# Patient Record
Sex: FEMALE | Race: BLACK OR AFRICAN AMERICAN | Employment: UNEMPLOYED | URBAN - METROPOLITAN AREA
[De-identification: names, ages, dates, MRNs, and addresses within clinical notes are randomized per-mention and may not be internally consistent; named-entity substitution may affect disease eponyms.]

---

## 2017-01-04 ENCOUNTER — APPOINTMENT (OUTPATIENT)
Dept: LAB | Facility: HOSPITAL | Age: 27
End: 2017-01-04
Attending: OBSTETRICS & GYNECOLOGY
Payer: MEDICAID

## 2017-01-04 ENCOUNTER — ROUTINE PRENATAL (OUTPATIENT)
Dept: OBGYN CLINIC | Facility: CLINIC | Age: 27
End: 2017-01-04

## 2017-01-04 VITALS
BODY MASS INDEX: 31 KG/M2 | WEIGHT: 164 LBS | SYSTOLIC BLOOD PRESSURE: 105 MMHG | DIASTOLIC BLOOD PRESSURE: 67 MMHG | HEART RATE: 85 BPM

## 2017-01-04 DIAGNOSIS — Z34.82 ENCOUNTER FOR SUPERVISION OF OTHER NORMAL PREGNANCY IN SECOND TRIMESTER: Primary | ICD-10-CM

## 2017-01-04 DIAGNOSIS — Z34.92 ENCOUNTER FOR SUPERVISION OF NORMAL PREGNANCY IN SECOND TRIMESTER, UNSPECIFIED GRAVIDITY: ICD-10-CM

## 2017-01-04 LAB
APPEARANCE: CLEAR
ERYTHROCYTE [DISTWIDTH] IN BLOOD BY AUTOMATED COUNT: 14.1 % (ref 11–15)
GLUCOSE 1H P 50 G GLC PO SERPL-MCNC: 114 MG/DL
HCT VFR BLD AUTO: 28.7 % (ref 35–48)
HGB BLD-MCNC: 9.7 G/DL (ref 12–16)
MCH RBC QN AUTO: 29.1 PG (ref 27–32)
MCHC RBC AUTO-ENTMCNC: 33.6 G/DL (ref 32–37)
MCV RBC AUTO: 86.6 FL (ref 80–100)
MULTISTIX LOT#: NORMAL NUMERIC
PLATELET # BLD AUTO: 311 K/UL (ref 140–400)
PMV BLD AUTO: 7 FL (ref 7.4–10.3)
RBC # BLD AUTO: 3.32 M/UL (ref 3.7–5.4)
URINE-COLOR: YELLOW
WBC # BLD AUTO: 8.1 K/UL (ref 4–11)

## 2017-01-04 PROCEDURE — 36415 COLL VENOUS BLD VENIPUNCTURE: CPT

## 2017-01-04 PROCEDURE — 0502F SUBSEQUENT PRENATAL CARE: CPT | Performed by: OBSTETRICS & GYNECOLOGY

## 2017-01-04 PROCEDURE — 82950 GLUCOSE TEST: CPT

## 2017-01-04 PROCEDURE — 85027 COMPLETE CBC AUTOMATED: CPT

## 2017-01-15 ENCOUNTER — HOSPITAL ENCOUNTER (OUTPATIENT)
Facility: HOSPITAL | Age: 27
Discharge: HOME | End: 2017-01-15
Attending: OBSTETRICS & GYNECOLOGY | Admitting: OBSTETRICS & GYNECOLOGY
Payer: MEDICAID

## 2017-01-15 ENCOUNTER — TELEPHONE (OUTPATIENT)
Dept: OBGYN CLINIC | Facility: CLINIC | Age: 27
End: 2017-01-15

## 2017-01-15 VITALS
RESPIRATION RATE: 16 BRPM | DIASTOLIC BLOOD PRESSURE: 59 MMHG | SYSTOLIC BLOOD PRESSURE: 108 MMHG | HEART RATE: 94 BPM | TEMPERATURE: 98 F

## 2017-01-15 LAB
CLARITY UR: CLEAR
GLUCOSE UR STRIP-MCNC: NEGATIVE MG/DL
HGB UR QL STRIP: NEGATIVE
KETONES UR STRIP-MCNC: >=160 MG/DL
LEUKOCYTE ESTERASE UR QL STRIP: NEGATIVE
NITRITE UR QL STRIP: NEGATIVE
PH UR STRIP: 6.5 [PH]
PROT UR STRIP-MCNC: 30 MG/DL
SP GR UR STRIP: 1.02
UROBILINOGEN UR STRIP-ACNC: 2 MG/DL

## 2017-01-15 PROCEDURE — 59025 FETAL NON-STRESS TEST: CPT | Performed by: OBSTETRICS & GYNECOLOGY

## 2017-01-15 RX ORDER — ONDANSETRON 2 MG/ML
4 INJECTION INTRAMUSCULAR; INTRAVENOUS EVERY 6 HOURS PRN
Status: DISCONTINUED | OUTPATIENT
Start: 2017-01-15 | End: 2017-01-15

## 2017-01-15 RX ORDER — DEXTROSE, SODIUM CHLORIDE, SODIUM LACTATE, POTASSIUM CHLORIDE, AND CALCIUM CHLORIDE 5; .6; .31; .03; .02 G/100ML; G/100ML; G/100ML; G/100ML; G/100ML
INJECTION, SOLUTION INTRAVENOUS CONTINUOUS
Status: DISCONTINUED | OUTPATIENT
Start: 2017-01-15 | End: 2017-01-15

## 2017-01-15 NOTE — TRIAGE
[facility] HOSP - [facility]      Triage Note     Patient Status:  Observation    1990 MRN [id number]   Location [address] C-D Attending Jess Ramirez DO   Hosp Day # 0 PCP [address]: [id number] morning @ 0545. States the last time she vomitted was at 0545. Denies nausea at this time. Pt. Denies u/c, abdominal or low back pain. Urine dipstick completed. Orders received for monitoring and IV hydration. NST appropriate for gestational age.
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
## 2017-01-15 NOTE — TELEPHONE ENCOUNTER
ON Call: 28 wk pregnant c/o n/v/d x 1 day. \"Feeling very weak and passed out\" when getting up to go to bathroom. Didn't hit head. States she didn't lose consciousness however did fall to ground landing on her abdomen.   Pt to come to [facility] for evaluation

## 2017-01-18 ENCOUNTER — ROUTINE PRENATAL (OUTPATIENT)
Dept: OBGYN CLINIC | Facility: CLINIC | Age: 27
End: 2017-01-18

## 2017-01-18 VITALS
BODY MASS INDEX: 31 KG/M2 | HEART RATE: 92 BPM | DIASTOLIC BLOOD PRESSURE: 70 MMHG | SYSTOLIC BLOOD PRESSURE: 111 MMHG | WEIGHT: 166 LBS

## 2017-01-18 DIAGNOSIS — Z34.93 ENCOUNTER FOR SUPERVISION OF NORMAL PREGNANCY IN THIRD TRIMESTER, UNSPECIFIED GRAVIDITY: Primary | ICD-10-CM

## 2017-01-18 PROBLEM — O99.019 ANTEPARTUM ANEMIA: Status: ACTIVE | Noted: 2017-01-18

## 2017-01-18 PROBLEM — O21.9 NAUSEA/VOMITING IN PREGNANCY (HCC): Status: ACTIVE | Noted: 2017-01-18

## 2017-01-18 PROBLEM — O99.019 ANTEPARTUM ANEMIA (HCC): Status: ACTIVE | Noted: 2017-01-18

## 2017-01-18 PROBLEM — O21.9 NAUSEA/VOMITING IN PREGNANCY: Status: ACTIVE | Noted: 2017-01-18

## 2017-01-18 LAB
KETONES (URINE DIPSTICK): 5 MG/DL
MULTISTIX LOT#: NORMAL NUMERIC
PH, URINE: 6 (ref 4.5–8)
SPECIFIC GRAVITY: 1.01 (ref 1–1.03)

## 2017-01-18 PROCEDURE — 0502F SUBSEQUENT PRENATAL CARE: CPT | Performed by: OBSTETRICS & GYNECOLOGY

## 2017-01-18 RX ORDER — DOXYLAMINE SUCCINATE AND PYRIDOXINE HYDROCHLORIDE, DELAYED RELEASE TABLETS 10 MG/10 MG 10; 10 MG/1; MG/1
TABLET, DELAYED RELEASE ORAL
COMMUNITY
Start: 2016-10-10 | End: 2017-01-18

## 2017-01-18 RX ORDER — PYRIDOXINE HCL (VITAMIN B6) 25 MG
TABLET ORAL
COMMUNITY
Start: 2016-10-10 | End: 2017-01-18

## 2017-02-01 ENCOUNTER — HOSPITAL ENCOUNTER (OUTPATIENT)
Facility: HOSPITAL | Age: 27
Discharge: HOME | End: 2017-02-01
Attending: OBSTETRICS & GYNECOLOGY | Admitting: OBSTETRICS & GYNECOLOGY
Payer: MEDICAID

## 2017-02-01 ENCOUNTER — ROUTINE PRENATAL (OUTPATIENT)
Dept: OBGYN CLINIC | Facility: CLINIC | Age: 27
End: 2017-02-01

## 2017-02-01 VITALS
RESPIRATION RATE: 20 BRPM | HEART RATE: 92 BPM | SYSTOLIC BLOOD PRESSURE: 119 MMHG | TEMPERATURE: 99 F | DIASTOLIC BLOOD PRESSURE: 64 MMHG

## 2017-02-01 VITALS
WEIGHT: 165 LBS | HEART RATE: 93 BPM | BODY MASS INDEX: 31 KG/M2 | SYSTOLIC BLOOD PRESSURE: 120 MMHG | DIASTOLIC BLOOD PRESSURE: 73 MMHG

## 2017-02-01 DIAGNOSIS — Z34.83 ENCOUNTER FOR SUPERVISION OF OTHER NORMAL PREGNANCY IN THIRD TRIMESTER: Primary | ICD-10-CM

## 2017-02-01 DIAGNOSIS — O47.03 PRETERM UTERINE CONTRACTIONS, ANTEPARTUM, THIRD TRIMESTER: Primary | ICD-10-CM

## 2017-02-01 LAB
APPEARANCE: CLEAR
BASOPHILS # BLD: 0 K/UL (ref 0–0.2)
BASOPHILS NFR BLD: 0 %
BILIRUB UR QL: NEGATIVE
CLARITY UR: CLEAR
COLOR UR: COLORLESS
CRP SERPL-MCNC: 1 MG/DL (ref 0–0.9)
EOSINOPHIL # BLD: 0 K/UL (ref 0–0.7)
EOSINOPHIL NFR BLD: 0 %
ERYTHROCYTE [DISTWIDTH] IN BLOOD BY AUTOMATED COUNT: 15.7 % (ref 11–15)
GLUCOSE UR-MCNC: NEGATIVE MG/DL
HCT VFR BLD AUTO: 29.7 % (ref 35–48)
HGB BLD-MCNC: 10 G/DL (ref 12–16)
HGB UR QL STRIP.AUTO: NEGATIVE
KETONES UR-MCNC: 20 MG/DL
LEUKOCYTE ESTERASE UR QL STRIP.AUTO: NEGATIVE
LYMPHOCYTES # BLD: 1.5 K/UL (ref 1–4)
LYMPHOCYTES NFR BLD: 19 %
MCH RBC QN AUTO: 28.1 PG (ref 27–32)
MCHC RBC AUTO-ENTMCNC: 33.6 G/DL (ref 32–37)
MCV RBC AUTO: 83.7 FL (ref 80–100)
MONOCYTES # BLD: 0.4 K/UL (ref 0–1)
MONOCYTES NFR BLD: 5 %
MULTISTIX LOT#: ABNORMAL NUMERIC
NEUTROPHILS # BLD AUTO: 6.1 K/UL (ref 1.8–7.7)
NEUTROPHILS NFR BLD: 76 %
NITRITE UR QL STRIP.AUTO: NEGATIVE
PH UR: 7 [PH] (ref 5–8)
PH, URINE: 8 (ref 4.5–8)
PLATELET # BLD AUTO: 302 K/UL (ref 140–400)
PMV BLD AUTO: 7 FL (ref 7.4–10.3)
PROT UR-MCNC: NEGATIVE MG/DL
RBC # BLD AUTO: 3.55 M/UL (ref 3.7–5.4)
SP GR UR STRIP: 1 (ref 1–1.03)
SPECIFIC GRAVITY: 1 (ref 1–1.03)
URINE-COLOR: YELLOW
UROBILINOGEN UR STRIP-ACNC: <2
VIT C UR-MCNC: NEGATIVE MG/DL
WBC # BLD AUTO: 8 K/UL (ref 4–11)

## 2017-02-01 PROCEDURE — 59025 FETAL NON-STRESS TEST: CPT | Performed by: OBSTETRICS & GYNECOLOGY

## 2017-02-01 PROCEDURE — 0502F SUBSEQUENT PRENATAL CARE: CPT | Performed by: OBSTETRICS & GYNECOLOGY

## 2017-02-01 RX ORDER — TERBUTALINE SULFATE 1 MG/ML
0.25 INJECTION, SOLUTION SUBCUTANEOUS
Status: COMPLETED | OUTPATIENT
Start: 2017-02-01 | End: 2017-02-01

## 2017-02-01 RX ORDER — TERBUTALINE SULFATE 1 MG/ML
0.25 INJECTION, SOLUTION SUBCUTANEOUS
Status: DISCONTINUED | OUTPATIENT
Start: 2017-02-01 | End: 2017-02-01

## 2017-02-01 RX ORDER — BETAMETHASONE SODIUM PHOSPHATE AND BETAMETHASONE ACETATE 3; 3 MG/ML; MG/ML
12 INJECTION, SUSPENSION INTRA-ARTICULAR; INTRALESIONAL; INTRAMUSCULAR; SOFT TISSUE EVERY 24 HOURS
Status: DISCONTINUED | OUTPATIENT
Start: 2017-02-01 | End: 2017-02-01

## 2017-02-01 RX ORDER — SODIUM CHLORIDE, SODIUM LACTATE, POTASSIUM CHLORIDE, CALCIUM CHLORIDE 600; 310; 30; 20 MG/100ML; MG/100ML; MG/100ML; MG/100ML
INJECTION, SOLUTION INTRAVENOUS CONTINUOUS
Status: DISCONTINUED | OUTPATIENT
Start: 2017-02-01 | End: 2017-02-01

## 2017-02-01 NOTE — PROGRESS NOTES
Pt c/o inc pelvic pressure for the last couple days. Cx 1/60/-3. No presenting part. To [facility] for NST to r/o PTL. Unable to do FFN in office has patient had IC within the last 24 hours. RTC 2wks.

## 2017-02-01 NOTE — PROGRESS NOTES
Orders received to start IV,  but patient has poor access and 3 attempts made with no success. Drank full pitcher of water,  Contractions continue. Dr. Jennifer Mckeon at bedside . Asked for more IV attempts then he will recheck cervix.

## 2017-02-01 NOTE — CONSULTS
[address] [facility] Patient Status:  Outpatient in a Bed    1990 MRN [id number]   Location [facility] Attending Hernandez Stewart MD   Hosp Day # 0 PCP Flaco Bustillo     SUBJECTIVE:  Reason for Systems:  unremarkable    OBJECTIVE:  Temp:  [98.6 °F (37 °C)] 98.6 °F (37 °C)  Pulse:  [92-93] 92  Resp:  [20] 20  BP: (119-120)/(64-73) 119/64 mmHg  No intake or output data in the 24 hours ending 02/01/17 1631    Physical Exam:  General appearance: aler

## 2017-02-01 NOTE — TRIAGE
[facility] HOSP - [facility]      Triage Note    [place] Patient Status:  Outpatient in a Bed    1990 MRN [id number]   Location [address] C-D Attending Catalina Chanel MD   Hosp Day # 0 PCP Behzad Escobar: Salvador Victoria level II ultrasound. FHR reassuring with variables. To return tomorrow for 2nd betamethazone and NST. Home with instructions and verbalized understanding. No cervical change per Dr. Franki Lee. Contractions diminished and pt feeling less pressure.

## 2017-02-01 NOTE — PROGRESS NOTES
Ambulatory across waiting area to triage 5 for Level II ultrasound. Does not feel any contractions,  and states that pressure is less than previously felt. FHR reassuring. Labs available for viewing by Dr. Diana Fox.

## 2017-02-02 ENCOUNTER — APPOINTMENT (OUTPATIENT)
Dept: OBGYN CLINIC | Facility: HOSPITAL | Age: 27
End: 2017-02-02
Payer: MEDICAID

## 2017-02-02 ENCOUNTER — HOSPITAL ENCOUNTER (OUTPATIENT)
Facility: HOSPITAL | Age: 27
Discharge: HOME | End: 2017-02-02
Attending: OBSTETRICS & GYNECOLOGY | Admitting: OBSTETRICS & GYNECOLOGY
Payer: MEDICAID

## 2017-02-02 VITALS
SYSTOLIC BLOOD PRESSURE: 115 MMHG | HEART RATE: 108 BPM | TEMPERATURE: 99 F | DIASTOLIC BLOOD PRESSURE: 63 MMHG | RESPIRATION RATE: 18 BRPM

## 2017-02-02 PROBLEM — O47.00 THREATENED PRETERM LABOR: Status: ACTIVE | Noted: 2017-02-02

## 2017-02-02 PROBLEM — O47.00 THREATENED PRETERM LABOR (HCC): Status: ACTIVE | Noted: 2017-02-02

## 2017-02-02 PROCEDURE — 59025 FETAL NON-STRESS TEST: CPT | Performed by: OBSTETRICS & GYNECOLOGY

## 2017-02-02 RX ORDER — BETAMETHASONE SODIUM PHOSPHATE AND BETAMETHASONE ACETATE 3; 3 MG/ML; MG/ML
12 INJECTION, SUSPENSION INTRA-ARTICULAR; INTRALESIONAL; INTRAMUSCULAR; SOFT TISSUE ONCE
Status: COMPLETED | OUTPATIENT
Start: 2017-02-02 | End: 2017-02-02

## 2017-02-02 NOTE — NST
Nonstress Test   Patient: Lenis June    Gestation: 30w3d    NST: repeat NSTt ptl, and 2nd betamethasone injection today       Variability: Moderate           Accelerations: Yes           Decelerations: None            Baseline: 135 BPM           Uterin

## 2017-02-14 ENCOUNTER — ROUTINE PRENATAL (OUTPATIENT)
Dept: OBGYN CLINIC | Facility: CLINIC | Age: 27
End: 2017-02-14

## 2017-02-14 VITALS
SYSTOLIC BLOOD PRESSURE: 126 MMHG | HEART RATE: 94 BPM | DIASTOLIC BLOOD PRESSURE: 76 MMHG | WEIGHT: 166 LBS | BODY MASS INDEX: 31 KG/M2

## 2017-02-14 DIAGNOSIS — Z34.93 ENCOUNTER FOR SUPERVISION OF NORMAL PREGNANCY IN THIRD TRIMESTER, UNSPECIFIED GRAVIDITY: Primary | ICD-10-CM

## 2017-02-14 LAB
APPEARANCE: CLEAR
MULTISTIX EXPIRATION DATE: NORMAL DATE
MULTISTIX LOT#: NORMAL NUMERIC
PH, URINE: 7.5 (ref 4.5–8)
SPECIFIC GRAVITY: 1 (ref 1–1.03)
URINE-COLOR: YELLOW

## 2017-02-14 PROCEDURE — 0502F SUBSEQUENT PRENATAL CARE: CPT | Performed by: OBSTETRICS & GYNECOLOGY

## 2017-02-16 ENCOUNTER — TELEPHONE (OUTPATIENT)
Dept: OBGYN CLINIC | Facility: CLINIC | Age: 27
End: 2017-02-16

## 2017-02-16 PROBLEM — Z34.90 PREGNANCY, OBSTETRICAL CARE (HCC): Status: ACTIVE | Noted: 2017-02-16

## 2017-02-16 PROBLEM — Z34.90 PREGNANCY, OBSTETRICAL CARE: Status: ACTIVE | Noted: 2017-02-16

## 2017-02-16 NOTE — TELEPHONE ENCOUNTER
OB GYN SURGICAL SCHEDULING    Assessment: Term pregnancy and previous  section    Pre-Operative Procedure:  Repeat     Side: neither    In / on: 17 AM if possible    Date:  17    Admission:  AM Admit    Anesthesia: Spinal    Ad

## 2017-02-16 NOTE — TELEPHONE ENCOUNTER
Patient scheduled 4/3/17 at 1:30pm BRENDA STOVALL to assist.  Patient's instructions mailed. PAT orders entered. Tried to contact the patient one number is inactive and the other was noted as wrong number.

## 2017-02-21 ENCOUNTER — TELEPHONE (OUTPATIENT)
Dept: OBGYN CLINIC | Facility: CLINIC | Age: 27
End: 2017-02-21

## 2017-02-21 NOTE — TELEPHONE ENCOUNTER
Pt states that when she went to the bathroom and wiped there was mucus on tissue. Pt denies any collection of mucus in toilet. Pt denies any vaginal itching or irritation. Pt denies any bleeding, cramping or contractions.  Pt states baby is moving as much a

## 2017-02-24 ENCOUNTER — TELEPHONE (OUTPATIENT)
Dept: OBGYN CLINIC | Facility: CLINIC | Age: 27
End: 2017-02-24

## 2017-02-24 ENCOUNTER — HOSPITAL ENCOUNTER (OUTPATIENT)
Facility: HOSPITAL | Age: 27
Discharge: HOME | End: 2017-02-24
Attending: OBSTETRICS & GYNECOLOGY | Admitting: OBSTETRICS & GYNECOLOGY
Payer: MEDICAID

## 2017-02-24 VITALS — HEART RATE: 99 BPM | DIASTOLIC BLOOD PRESSURE: 61 MMHG | SYSTOLIC BLOOD PRESSURE: 111 MMHG

## 2017-02-24 PROBLEM — O42.90 AMNIOTIC FLUID LEAKING (HCC): Status: ACTIVE | Noted: 2017-02-24

## 2017-02-24 PROBLEM — O42.90 AMNIOTIC FLUID LEAKING: Status: ACTIVE | Noted: 2017-02-24

## 2017-02-24 LAB
AMNI: NEGATIVE
BACTERIA UR QL AUTO: NEGATIVE /HPF
BILIRUB UR QL: NEGATIVE
CLARITY UR: CLEAR
COLOR UR: YELLOW
GLUCOSE UR-MCNC: NEGATIVE MG/DL
HGB UR QL STRIP.AUTO: NEGATIVE
KETONES UR-MCNC: NEGATIVE MG/DL
NITRITE UR QL STRIP.AUTO: NEGATIVE
PH UR: 7 [PH] (ref 5–8)
PROT UR-MCNC: NEGATIVE MG/DL
RBC #/AREA URNS AUTO: 3 /HPF
SP GR UR STRIP: 1.02 (ref 1–1.03)
UROBILINOGEN UR STRIP-ACNC: 2
VIT C UR-MCNC: NEGATIVE MG/DL
WBC #/AREA URNS AUTO: 2 /HPF

## 2017-02-24 PROCEDURE — 59025 FETAL NON-STRESS TEST: CPT | Performed by: OBSTETRICS & GYNECOLOGY

## 2017-02-24 NOTE — TELEPHONE ENCOUNTER
Called pt back for f/u. Pt stated that over the last hour her pad has been dry and she hasnt noticed any LOF. Pt reports +FM.  Pt stated she is still feeling abdominal cramping/pressure in her vagina every 10 min lasting about 60 seconds now for the past ho

## 2017-02-24 NOTE — PROGRESS NOTES
Patient presented with complaints of leaking fluid this am. Also complaints of tightening today. Was seen on 17 for  contractions and had 2 doses of betamethasone 24 hours apart.

## 2017-02-24 NOTE — TRIAGE
[facility] HOSP - [facility]      Triage Note    Akie Daily Patient Status:  Outpatient in a Bed    1990 MRN [id number]   Location [address] C-D Attending Christa Dai MD   Hosp Day # 0 PCP Josephus Harada Madolyn Craven: Bello Calderón Reactive           Nonstress Test Second Interpretation: Reactive          FHR Category: Category I           Additional Comments   Dr. Ale Lee updated on maternal and fetal status. Amnisure negative, amnioindicator negative and no fernning. Cervix closed.  Ut

## 2017-02-24 NOTE — TELEPHONE ENCOUNTER
Pt is 33w4d calling to report that after she went to the BR this morning she \"felt something come out\" of her vagina and her underwear was \"really wet\". Pt stated that about half of her underwear felt wet and the fluid color looked clear.  Pt stated she

## 2017-03-02 ENCOUNTER — ROUTINE PRENATAL (OUTPATIENT)
Dept: OBGYN CLINIC | Facility: CLINIC | Age: 27
End: 2017-03-02

## 2017-03-02 ENCOUNTER — APPOINTMENT (OUTPATIENT)
Dept: LAB | Facility: HOSPITAL | Age: 27
End: 2017-03-02
Attending: OBSTETRICS & GYNECOLOGY
Payer: MEDICAID

## 2017-03-02 VITALS
DIASTOLIC BLOOD PRESSURE: 71 MMHG | WEIGHT: 167 LBS | HEART RATE: 95 BPM | SYSTOLIC BLOOD PRESSURE: 113 MMHG | BODY MASS INDEX: 32 KG/M2

## 2017-03-02 DIAGNOSIS — Z34.93 ENCOUNTER FOR SUPERVISION OF NORMAL PREGNANCY IN THIRD TRIMESTER, UNSPECIFIED GRAVIDITY: Primary | ICD-10-CM

## 2017-03-02 DIAGNOSIS — Z34.93 ENCOUNTER FOR SUPERVISION OF NORMAL PREGNANCY IN THIRD TRIMESTER, UNSPECIFIED GRAVIDITY: ICD-10-CM

## 2017-03-02 LAB
ERYTHROCYTE [DISTWIDTH] IN BLOOD BY AUTOMATED COUNT: 16.9 % (ref 11–15)
HCT VFR BLD AUTO: 25.4 % (ref 35–48)
HGB BLD-MCNC: 8.3 G/DL (ref 12–16)
MCH RBC QN AUTO: 26.9 PG (ref 27–32)
MCHC RBC AUTO-ENTMCNC: 32.6 G/DL (ref 32–37)
MCV RBC AUTO: 82.5 FL (ref 80–100)
MULTISTIX LOT#: NORMAL NUMERIC
PH, URINE: 7 (ref 4.5–8)
PLATELET # BLD AUTO: 265 K/UL (ref 140–400)
PMV BLD AUTO: 8.1 FL (ref 7.4–10.3)
RBC # BLD AUTO: 3.08 M/UL (ref 3.7–5.4)
SPECIFIC GRAVITY: 1.01 (ref 1–1.03)
UROBILINOGEN,SEMI-QN: 0.2 MG/DL (ref 0–1.9)
WBC # BLD AUTO: 6 K/UL (ref 4–11)

## 2017-03-02 PROCEDURE — 85027 COMPLETE CBC AUTOMATED: CPT

## 2017-03-02 PROCEDURE — 0502F SUBSEQUENT PRENATAL CARE: CPT | Performed by: OBSTETRICS & GYNECOLOGY

## 2017-03-02 PROCEDURE — 36415 COLL VENOUS BLD VENIPUNCTURE: CPT

## 2017-03-02 PROCEDURE — 86780 TREPONEMA PALLIDUM: CPT

## 2017-03-02 NOTE — PROGRESS NOTES
Pt states she has irregular contractions. No LOF or Bleeding. Feeling good fetal movement.    RTC 2 wks

## 2017-03-03 LAB — T PALLIDUM AB SER QL: NEGATIVE

## 2017-03-07 ENCOUNTER — TELEPHONE (OUTPATIENT)
Dept: OBGYN CLINIC | Facility: CLINIC | Age: 27
End: 2017-03-07

## 2017-03-07 NOTE — TELEPHONE ENCOUNTER
Paged while on call at [phone] and returned call immediately. Pt with cramping this morning and pelvic pressure. No bleeding and good fetal movement.  Pt told to hydrate and rest. If pt pam she is to call back when they are every 15 minutes and regular

## 2017-03-12 ENCOUNTER — HOSPITAL ENCOUNTER (OUTPATIENT)
Facility: HOSPITAL | Age: 27
Setting detail: OBSERVATION
Discharge: HOME | End: 2017-03-12
Attending: OBSTETRICS & GYNECOLOGY | Admitting: OBSTETRICS & GYNECOLOGY
Payer: MEDICAID

## 2017-03-12 ENCOUNTER — TELEPHONE (OUTPATIENT)
Dept: OBGYN CLINIC | Facility: CLINIC | Age: 27
End: 2017-03-12

## 2017-03-12 VITALS
TEMPERATURE: 99 F | RESPIRATION RATE: 16 BRPM | SYSTOLIC BLOOD PRESSURE: 121 MMHG | HEART RATE: 93 BPM | DIASTOLIC BLOOD PRESSURE: 67 MMHG

## 2017-03-12 PROBLEM — Z34.90 PREGNANCY (HCC): Status: ACTIVE | Noted: 2017-03-12

## 2017-03-12 PROBLEM — Z34.90 PREGNANCY: Status: ACTIVE | Noted: 2017-03-12

## 2017-03-12 PROCEDURE — 59025 FETAL NON-STRESS TEST: CPT | Performed by: OBSTETRICS & GYNECOLOGY

## 2017-03-12 NOTE — TELEPHONE ENCOUNTER
Spoke with patient c/o UC's q 15-20 min and increased DC since early AM. She has been hydrating with no help. She is planned term RLTCS.  I asked her to come to [facility] for labor eval.

## 2017-03-12 NOTE — TRIAGE
[facility]      Triage Note    Serena Fallon Patient Status:  Observation    1990 MRN [id number]   Location [address] C-D Attending Hilario Don, [facility] Day # 0 PCP 50 [address]: [id number] Pt denies nausea/vomiting or diarrhea. No edema. Denies LOF.        Dianne Griffin RN  3/12/2017 3:47 PM

## 2017-03-13 ENCOUNTER — ROUTINE PRENATAL (OUTPATIENT)
Dept: OBGYN CLINIC | Facility: CLINIC | Age: 27
End: 2017-03-13

## 2017-03-13 VITALS
HEART RATE: 84 BPM | WEIGHT: 171.63 LBS | SYSTOLIC BLOOD PRESSURE: 118 MMHG | BODY MASS INDEX: 32 KG/M2 | DIASTOLIC BLOOD PRESSURE: 76 MMHG

## 2017-03-13 DIAGNOSIS — Z34.93 ENCOUNTER FOR SUPERVISION OF NORMAL PREGNANCY IN THIRD TRIMESTER, UNSPECIFIED GRAVIDITY: Primary | ICD-10-CM

## 2017-03-13 LAB
APPEARANCE: CLEAR
MULTISTIX LOT#: NORMAL NUMERIC
PH, URINE: 7.5 (ref 4.5–8)
SPECIFIC GRAVITY: 1.01 (ref 1–1.03)
URINE-COLOR: YELLOW

## 2017-03-13 PROCEDURE — 0502F SUBSEQUENT PRENATAL CARE: CPT | Performed by: OBSTETRICS & GYNECOLOGY

## 2017-03-13 NOTE — PROGRESS NOTES
NO issues. Contractions have stopped since yesterdays visit to [facility]. NO LOF or VB.  +FM. GBS collected. Encouraged IRON BID for anemia. RTC 1 wk.

## 2017-03-13 NOTE — PROGRESS NOTES
NST note:    Indication: threatened  labor    FHT baseline: 130    Variability: moderate    Accels:  present    Decels: No    Tocos:  Yes    Category: 1 tracing    Plan: Seen and examined by me. Cervix 1 cm external / closed internal / 0% / -3.  PTL

## 2017-03-20 ENCOUNTER — HOSPITAL ENCOUNTER (INPATIENT)
Facility: HOSPITAL | Age: 27
LOS: 3 days | Discharge: HOME | End: 2017-03-23
Attending: OBSTETRICS & GYNECOLOGY | Admitting: OBSTETRICS & GYNECOLOGY
Payer: MEDICAID

## 2017-03-20 ENCOUNTER — ANESTHESIA EVENT (OUTPATIENT)
Dept: OBGYN UNIT | Facility: HOSPITAL | Age: 27
End: 2017-03-20
Payer: MEDICAID

## 2017-03-20 ENCOUNTER — TELEPHONE (OUTPATIENT)
Dept: OBGYN CLINIC | Facility: CLINIC | Age: 27
End: 2017-03-20

## 2017-03-20 ENCOUNTER — ROUTINE PRENATAL (OUTPATIENT)
Dept: OBGYN CLINIC | Facility: CLINIC | Age: 27
End: 2017-03-20

## 2017-03-20 ENCOUNTER — SURGERY (OUTPATIENT)
Age: 27
End: 2017-03-20

## 2017-03-20 ENCOUNTER — ANESTHESIA (OUTPATIENT)
Dept: OBGYN UNIT | Facility: HOSPITAL | Age: 27
End: 2017-03-20
Payer: MEDICAID

## 2017-03-20 VITALS
HEART RATE: 94 BPM | WEIGHT: 176.19 LBS | BODY MASS INDEX: 33 KG/M2 | DIASTOLIC BLOOD PRESSURE: 83 MMHG | SYSTOLIC BLOOD PRESSURE: 125 MMHG

## 2017-03-20 DIAGNOSIS — Z34.93 ENCOUNTER FOR SUPERVISION OF NORMAL PREGNANCY IN THIRD TRIMESTER, UNSPECIFIED GRAVIDITY: Primary | ICD-10-CM

## 2017-03-20 PROBLEM — Z98.891 H/O CESAREAN SECTION: Status: ACTIVE | Noted: 2017-03-20

## 2017-03-20 LAB
ANTIBODY SCREEN: NEGATIVE
BASOPHILS # BLD: 0 K/UL (ref 0–0.2)
BASOPHILS NFR BLD: 1 %
EOSINOPHIL # BLD: 0 K/UL (ref 0–0.7)
EOSINOPHIL NFR BLD: 0 %
ERYTHROCYTE [DISTWIDTH] IN BLOOD BY AUTOMATED COUNT: 18.3 % (ref 11–15)
HCT VFR BLD AUTO: 27.4 % (ref 35–48)
HGB BLD-MCNC: 9.1 G/DL (ref 12–16)
LYMPHOCYTES # BLD: 2 K/UL (ref 1–4)
LYMPHOCYTES NFR BLD: 23 %
MCH RBC QN AUTO: 26.5 PG (ref 27–32)
MCHC RBC AUTO-ENTMCNC: 33.1 G/DL (ref 32–37)
MCV RBC AUTO: 80.1 FL (ref 80–100)
MONOCYTES # BLD: 0.5 K/UL (ref 0–1)
MONOCYTES NFR BLD: 6 %
MULTISTIX LOT#: NORMAL NUMERIC
NEUTROPHILS # BLD AUTO: 6.1 K/UL (ref 1.8–7.7)
NEUTROPHILS NFR BLD: 71 %
PH, URINE: 7 (ref 4.5–8)
PLATELET # BLD AUTO: 280 K/UL (ref 140–400)
PMV BLD AUTO: 7.9 FL (ref 7.4–10.3)
RBC # BLD AUTO: 3.42 M/UL (ref 3.7–5.4)
RH BLOOD TYPE: POSITIVE
SPECIFIC GRAVITY: 1.01 (ref 1–1.03)
UROBILINOGEN,SEMI-QN: 0.2 MG/DL (ref 0–1.9)
WBC # BLD AUTO: 8.7 K/UL (ref 4–11)

## 2017-03-20 PROCEDURE — 59514 CESAREAN DELIVERY ONLY: CPT | Performed by: OBSTETRICS & GYNECOLOGY

## 2017-03-20 PROCEDURE — 0502F SUBSEQUENT PRENATAL CARE: CPT | Performed by: OBSTETRICS & GYNECOLOGY

## 2017-03-20 RX ORDER — MELATONIN
325
COMMUNITY
End: 2017-04-02

## 2017-03-20 RX ORDER — ONDANSETRON 2 MG/ML
4 INJECTION INTRAMUSCULAR; INTRAVENOUS EVERY 6 HOURS PRN
Status: DISCONTINUED | OUTPATIENT
Start: 2017-03-20 | End: 2017-03-21 | Stop reason: HOSPADM

## 2017-03-20 RX ORDER — HALOPERIDOL 5 MG/ML
0.5 INJECTION INTRAMUSCULAR ONCE AS NEEDED
Status: ACTIVE | OUTPATIENT
Start: 2017-03-20 | End: 2017-03-20

## 2017-03-20 RX ORDER — HYDROCODONE BITARTRATE AND ACETAMINOPHEN 7.5; 325 MG/1; MG/1
1 TABLET ORAL EVERY 6 HOURS PRN
Status: DISPENSED | OUTPATIENT
Start: 2017-03-20 | End: 2017-03-21

## 2017-03-20 RX ORDER — DIPHENHYDRAMINE HYDROCHLORIDE 50 MG/ML
12.5 INJECTION INTRAMUSCULAR; INTRAVENOUS EVERY 4 HOURS PRN
Status: ACTIVE | OUTPATIENT
Start: 2017-03-20 | End: 2017-03-21

## 2017-03-20 RX ORDER — ACETAMINOPHEN 325 MG/1
650 TABLET ORAL EVERY 6 HOURS PRN
Status: ACTIVE | OUTPATIENT
Start: 2017-03-20 | End: 2017-03-21

## 2017-03-20 RX ORDER — METOCLOPRAMIDE HYDROCHLORIDE 5 MG/ML
10 INJECTION INTRAMUSCULAR; INTRAVENOUS ONCE
Status: COMPLETED | OUTPATIENT
Start: 2017-03-20 | End: 2017-03-20

## 2017-03-20 RX ORDER — DIPHENHYDRAMINE HCL 25 MG
25 CAPSULE ORAL EVERY 4 HOURS PRN
Status: ACTIVE | OUTPATIENT
Start: 2017-03-20 | End: 2017-03-21

## 2017-03-20 RX ORDER — HYDROCODONE BITARTRATE AND ACETAMINOPHEN 7.5; 325 MG/1; MG/1
2 TABLET ORAL EVERY 6 HOURS PRN
Status: ACTIVE | OUTPATIENT
Start: 2017-03-20 | End: 2017-03-21

## 2017-03-20 RX ORDER — HYDROMORPHONE HYDROCHLORIDE 1 MG/ML
0.6 INJECTION, SOLUTION INTRAMUSCULAR; INTRAVENOUS; SUBCUTANEOUS
Status: ACTIVE | OUTPATIENT
Start: 2017-03-20 | End: 2017-03-21

## 2017-03-20 RX ORDER — SODIUM CHLORIDE, SODIUM LACTATE, POTASSIUM CHLORIDE, CALCIUM CHLORIDE 600; 310; 30; 20 MG/100ML; MG/100ML; MG/100ML; MG/100ML
INJECTION, SOLUTION INTRAVENOUS
Status: COMPLETED
Start: 2017-03-20 | End: 2017-03-20

## 2017-03-20 RX ORDER — SODIUM CHLORIDE 9 MG/ML
INJECTION, SOLUTION INTRAVENOUS ONCE
Status: DISCONTINUED | OUTPATIENT
Start: 2017-03-20 | End: 2017-03-21

## 2017-03-20 RX ORDER — NALOXONE HYDROCHLORIDE 0.4 MG/ML
0.08 INJECTION, SOLUTION INTRAMUSCULAR; INTRAVENOUS; SUBCUTANEOUS
Status: ACTIVE | OUTPATIENT
Start: 2017-03-20 | End: 2017-03-21

## 2017-03-20 RX ORDER — SODIUM CHLORIDE 0.9 % (FLUSH) 0.9 %
10 SYRINGE (ML) INJECTION AS NEEDED
Status: DISCONTINUED | OUTPATIENT
Start: 2017-03-20 | End: 2017-03-21 | Stop reason: HOSPADM

## 2017-03-20 RX ORDER — HYDROMORPHONE HYDROCHLORIDE 1 MG/ML
0.4 INJECTION, SOLUTION INTRAMUSCULAR; INTRAVENOUS; SUBCUTANEOUS
Status: ACTIVE | OUTPATIENT
Start: 2017-03-20 | End: 2017-03-21

## 2017-03-20 RX ORDER — NALBUPHINE HCL 10 MG/ML
2.5 AMPUL (ML) INJECTION EVERY 4 HOURS PRN
Status: ACTIVE | OUTPATIENT
Start: 2017-03-20 | End: 2017-03-21

## 2017-03-20 RX ORDER — ONDANSETRON 2 MG/ML
4 INJECTION INTRAMUSCULAR; INTRAVENOUS ONCE AS NEEDED
Status: ACTIVE | OUTPATIENT
Start: 2017-03-20 | End: 2017-03-20

## 2017-03-20 RX ORDER — AMMONIA INHALANTS 0.04 G/.3ML
0.3 INHALANT RESPIRATORY (INHALATION) ONCE
Status: DISCONTINUED | OUTPATIENT
Start: 2017-03-20 | End: 2017-03-21 | Stop reason: HOSPADM

## 2017-03-20 RX ORDER — FAMOTIDINE 10 MG/ML
20 INJECTION, SOLUTION INTRAVENOUS ONCE
Status: DISCONTINUED | OUTPATIENT
Start: 2017-03-20 | End: 2017-03-21

## 2017-03-20 RX ORDER — SODIUM CHLORIDE, SODIUM LACTATE, POTASSIUM CHLORIDE, CALCIUM CHLORIDE 600; 310; 30; 20 MG/100ML; MG/100ML; MG/100ML; MG/100ML
INJECTION, SOLUTION INTRAVENOUS CONTINUOUS
Status: DISCONTINUED | OUTPATIENT
Start: 2017-03-20 | End: 2017-03-21 | Stop reason: HOSPADM

## 2017-03-20 RX ADMIN — SODIUM CHLORIDE, SODIUM LACTATE, POTASSIUM CHLORIDE, CALCIUM CHLORIDE: 600; 310; 30; 20 INJECTION, SOLUTION INTRAVENOUS at 23:20:00

## 2017-03-20 RX ADMIN — ONDANSETRON 4 MG: 2 INJECTION INTRAMUSCULAR; INTRAVENOUS at 23:55:00

## 2017-03-20 RX ADMIN — MIDAZOLAM HYDROCHLORIDE 2 MG: 1 INJECTION INTRAMUSCULAR; INTRAVENOUS at 23:58:00

## 2017-03-20 NOTE — PROGRESS NOTES
Pt uncomfortable and not sleeping well. Denies cramping or contractions. Reviewed labor precautions. Reviewed kick counts.    RTC 1 wk

## 2017-03-21 ENCOUNTER — TELEPHONE (OUTPATIENT)
Dept: OBGYN CLINIC | Facility: CLINIC | Age: 27
End: 2017-03-21

## 2017-03-21 PROBLEM — Z98.891 STATUS POST REPEAT LOW TRANSVERSE CESAREAN SECTION: Status: ACTIVE | Noted: 2017-03-21

## 2017-03-21 PROBLEM — Z98.891 STATUS POST CESAREAN SECTION: Status: ACTIVE | Noted: 2017-03-21

## 2017-03-21 LAB
BASOPHILS # BLD: 0 K/UL (ref 0–0.2)
BASOPHILS NFR BLD: 0 %
EOSINOPHIL # BLD: 0 K/UL (ref 0–0.7)
EOSINOPHIL NFR BLD: 0 %
ERYTHROCYTE [DISTWIDTH] IN BLOOD BY AUTOMATED COUNT: 18.5 % (ref 11–15)
HCT VFR BLD AUTO: 25.4 % (ref 35–48)
HGB BLD-MCNC: 8.3 G/DL (ref 12–16)
LYMPHOCYTES # BLD: 1.6 K/UL (ref 1–4)
LYMPHOCYTES NFR BLD: 13 %
MCH RBC QN AUTO: 26.4 PG (ref 27–32)
MCHC RBC AUTO-ENTMCNC: 32.6 G/DL (ref 32–37)
MCV RBC AUTO: 80.8 FL (ref 80–100)
MONOCYTES # BLD: 0.7 K/UL (ref 0–1)
MONOCYTES NFR BLD: 6 %
NEUTROPHILS # BLD AUTO: 10.1 K/UL (ref 1.8–7.7)
NEUTROPHILS NFR BLD: 81 %
PLATELET # BLD AUTO: 237 K/UL (ref 140–400)
PMV BLD AUTO: 7.9 FL (ref 7.4–10.3)
RBC # BLD AUTO: 3.14 M/UL (ref 3.7–5.4)
WBC # BLD AUTO: 12.4 K/UL (ref 4–11)

## 2017-03-21 PROCEDURE — 59514 CESAREAN DELIVERY ONLY: CPT | Performed by: OBSTETRICS & GYNECOLOGY

## 2017-03-21 RX ORDER — 0.9 % SODIUM CHLORIDE 0.9 %
VIAL (ML) INJECTION
Status: DISPENSED
Start: 2017-03-21 | End: 2017-03-22

## 2017-03-21 RX ORDER — ZOLPIDEM TARTRATE 5 MG/1
5 TABLET ORAL NIGHTLY PRN
Status: DISCONTINUED | OUTPATIENT
Start: 2017-03-21 | End: 2017-03-23

## 2017-03-21 RX ORDER — IBUPROFEN 600 MG/1
600 TABLET ORAL EVERY 6 HOURS
Status: DISCONTINUED | OUTPATIENT
Start: 2017-03-21 | End: 2017-03-23

## 2017-03-21 RX ORDER — AMMONIA INHALANTS 0.04 G/.3ML
0.3 INHALANT RESPIRATORY (INHALATION) AS NEEDED
Status: DISCONTINUED | OUTPATIENT
Start: 2017-03-21 | End: 2017-03-23

## 2017-03-21 RX ORDER — ONDANSETRON 2 MG/ML
INJECTION INTRAMUSCULAR; INTRAVENOUS AS NEEDED
Status: DISCONTINUED | OUTPATIENT
Start: 2017-03-20 | End: 2017-03-21 | Stop reason: SURG

## 2017-03-21 RX ORDER — MIDAZOLAM HYDROCHLORIDE 1 MG/ML
INJECTION INTRAMUSCULAR; INTRAVENOUS AS NEEDED
Status: DISCONTINUED | OUTPATIENT
Start: 2017-03-20 | End: 2017-03-21 | Stop reason: SURG

## 2017-03-21 RX ORDER — SODIUM PHOSPHATE, DIBASIC AND SODIUM PHOSPHATE, MONOBASIC 7; 19 G/133ML; G/133ML
1 ENEMA RECTAL ONCE AS NEEDED
Status: ACTIVE | OUTPATIENT
Start: 2017-03-21 | End: 2017-03-21

## 2017-03-21 RX ORDER — PRENATAL VIT,CAL 76/IRON/FOLIC 29 MG-1 MG
1 TABLET ORAL DAILY
Status: DISCONTINUED | OUTPATIENT
Start: 2017-03-21 | End: 2017-03-23

## 2017-03-21 RX ORDER — BISACODYL 10 MG
10 SUPPOSITORY, RECTAL RECTAL
Status: DISCONTINUED | OUTPATIENT
Start: 2017-03-21 | End: 2017-03-23

## 2017-03-21 RX ORDER — KETOROLAC TROMETHAMINE 30 MG/ML
30 INJECTION, SOLUTION INTRAMUSCULAR; INTRAVENOUS EVERY 6 HOURS
Status: DISPENSED | OUTPATIENT
Start: 2017-03-21 | End: 2017-03-23

## 2017-03-21 RX ORDER — SIMETHICONE 80 MG
80 TABLET,CHEWABLE ORAL 3 TIMES DAILY PRN
Status: DISCONTINUED | OUTPATIENT
Start: 2017-03-21 | End: 2017-03-23

## 2017-03-21 RX ORDER — POLYETHYLENE GLYCOL 3350 17 G/17G
17 POWDER, FOR SOLUTION ORAL DAILY PRN
Status: DISCONTINUED | OUTPATIENT
Start: 2017-03-21 | End: 2017-03-23

## 2017-03-21 RX ORDER — SODIUM CHLORIDE 0.9 % (FLUSH) 0.9 %
10 SYRINGE (ML) INJECTION AS NEEDED
Status: DISCONTINUED | OUTPATIENT
Start: 2017-03-21 | End: 2017-03-23

## 2017-03-21 RX ORDER — HYDROCODONE BITARTRATE AND ACETAMINOPHEN 5; 325 MG/1; MG/1
1-2 TABLET ORAL EVERY 6 HOURS PRN
Status: DISCONTINUED | OUTPATIENT
Start: 2017-03-21 | End: 2017-03-23

## 2017-03-21 RX ORDER — SODIUM CHLORIDE, SODIUM LACTATE, POTASSIUM CHLORIDE, CALCIUM CHLORIDE 600; 310; 30; 20 MG/100ML; MG/100ML; MG/100ML; MG/100ML
INJECTION, SOLUTION INTRAVENOUS CONTINUOUS PRN
Status: DISCONTINUED | OUTPATIENT
Start: 2017-03-20 | End: 2017-03-21 | Stop reason: SURG

## 2017-03-21 RX ORDER — ONDANSETRON 2 MG/ML
4 INJECTION INTRAMUSCULAR; INTRAVENOUS EVERY 6 HOURS PRN
Status: DISCONTINUED | OUTPATIENT
Start: 2017-03-21 | End: 2017-03-23

## 2017-03-21 RX ORDER — DEXTROSE, SODIUM CHLORIDE, SODIUM LACTATE, POTASSIUM CHLORIDE, AND CALCIUM CHLORIDE 5; .6; .31; .03; .02 G/100ML; G/100ML; G/100ML; G/100ML; G/100ML
INJECTION, SOLUTION INTRAVENOUS CONTINUOUS
Status: DISCONTINUED | OUTPATIENT
Start: 2017-03-21 | End: 2017-03-23

## 2017-03-21 RX ORDER — DOCUSATE SODIUM 100 MG/1
100 CAPSULE, LIQUID FILLED ORAL
Status: DISCONTINUED | OUTPATIENT
Start: 2017-03-21 | End: 2017-03-23

## 2017-03-21 RX ADMIN — SODIUM CHLORIDE, SODIUM LACTATE, POTASSIUM CHLORIDE, CALCIUM CHLORIDE: 600; 310; 30; 20 INJECTION, SOLUTION INTRAVENOUS at 00:41:00

## 2017-03-21 RX ADMIN — SODIUM CHLORIDE, SODIUM LACTATE, POTASSIUM CHLORIDE, CALCIUM CHLORIDE: 600; 310; 30; 20 INJECTION, SOLUTION INTRAVENOUS at 00:05:00

## 2017-03-21 NOTE — ANESTHESIA POSTPROCEDURE EVALUATION
Patient: Mabelene Daily    Procedure Summary     Date Anesthesia Start Anesthesia Stop Room / Location    17 [phone] (17) [facility] L+D OR H L+D OR       Procedure Diagnosis Surgeon Responsible Provider     (N/A ) (R C/S [facility] 4/10/17

## 2017-03-21 NOTE — DISCHARGE SUMMARY
[facility]    Discharge Summary    Guy Resendiz Patient Status:  Inpatient    1990 MRN [id number]   Location [address] C-D Attending Flo Phelan MD   [facility] Day # 1       Delivering OB Clinician: Dr. Karen Gavin

## 2017-03-21 NOTE — LACTATION NOTE
LACTATION NOTE - MOTHER           Problems identified  Problems identified: Knowledge deficit;Milk supply WNL  Problems Identified Other: Initiated breastfeeding but formula supplemented with formula after birth.     Maternal history  Maternal history: Cesa

## 2017-03-21 NOTE — OPERATIVE REPORT
Operative Report for  Section:    Date: 2017  Patient: Cristhian Zimmerman  : 1990  MRN: [id number]    Preoperative Diagnosis: Intrauterine Pregnancy 37w1d, Spontaneous rupture of membranes with history of  section   Postoperative off.  Attention was then turned to the inferior aspect of this incision which, in a similar fashion, was grasped, tented up with the Kocher clamps, and the rectus muscles dissected off.  Small portion of omentum was adhesed to fascia and removed with the marcia recovery room in stable condition.     Eli Johnson, 03/21/2017, 12:45 AM

## 2017-03-21 NOTE — H&P
31 Linda Lowe Patient Status:  Inpatient    1990 MRN [id number]   Location [address] C-D Attending Hardeep Mohan MD   Hosp Day # 0 MARIO Weaver     Date of Admission:  3/ Prescriptions prior to admission:  ferrous sulfate 325 (65 FE) MG Oral Tab EC Take 325 mg by mouth daily with breakfast. Disp:  Rfl:  3/19/2017 at Unknown time   Prenatal MV-Min-Fe Fum-FA-DHA (PRENATAL+DHA) 28-0.975 & 200 MG Oral Misc Take 1 tablet by mo Amelia Reynolds  3/20/2017  9:32 PM

## 2017-03-21 NOTE — PROGRESS NOTES
Patient received into room [360] via bed .    Bedside report received from [facility] - PAOLO coats RN.  Bed in locked and low position.  Side rails up x2.  Vitals signs within normal limits, fundus firm at U/U, lochia small, no clots noted.  IV site unremarkable.  Pa

## 2017-03-21 NOTE — PLAN OF CARE
ANXIETY    • Will report anxiety at manageable levels Progressing        BREAST FEEDING    • Optimize infant feeding at the breast Progressing        INADEQUATE LATCH, SUCK OR SWALLOW    • Demonstrate ability to latch and sustain latch, audible swallowing

## 2017-03-21 NOTE — PROGRESS NOTES
Post-Partum Note   3/21/2017, 8:22 AM    Subjective:  Patient doing well. Pain controlled currently . Lochia is small. She reports she has not tried a regular diet. She has not yet ambulated and denies lightheadedness. Hinton is present.  Overall patient s

## 2017-03-21 NOTE — ANESTHESIA PREPROCEDURE EVALUATION
Anesthesia PreOp Note    HPI:     Wesley Jackson is a 32year old female who presents for preoperative consultation requested by: Charla Jaeger DO    Date of Surgery: 3/20/2017 - 3/21/2017    Procedure(s):    Indication: R C/S [facility] 4/10/17    * injection 4 mg 4 mg Intravenous Q6H PRN Marek Jorgensen MD    CeFAZolin Sodium (ANCEF/KEFZOL) IVPB premix 2 g 2 g Intravenous 30 Min Pre-Op Marek Jorgensen MD    0.9%  NaCl infusion  Intravenous Once Marek Jorgensen MD    famoTIDine (PEPCID) injectio Other findings: Anemic        Anesthesia Plan:   ASA:  2  Emergent    Plan:   Spinal and general  Airway:  ETT  Post-op Pain Management: Intrathecal narcotics  Plan Comments: SAB with Duramorph, GETA if fails.   T&C for two units  Informed Consent Plan

## 2017-03-21 NOTE — TELEPHONE ENCOUNTER
Pt called with big gush of fluid and continued leaking. She was told to come to the hospital for further evaluation.

## 2017-03-21 NOTE — LACTATION NOTE
This note was copied from the chart of Can  Adiel Divine.   LACTATION NOTE - INFANT    Evaluation Type  Evaluation Type: Inpatient    Problems & Assessment  Problems Diagnosed or Identified: 37-38 weeks gestation;Sleepy  Infant Assessment: Minimal hunger cues pre

## 2017-03-21 NOTE — ANESTHESIA PROCEDURE NOTES
Spinal Block  Performed by: Irasema Galvan by: Analia Moore    Patient Location:  OB  Start Time:  3/20/2017 11:23 PM  End Time:  3/21/2017 11:37 PM  Site identification: surface landmarks    Reason for Block: surgical anesthesia

## 2017-03-22 NOTE — PROGRESS NOTES
[facility]    OB/GYNE Progress Note      Vlad Lopez Patient Status:  Inpatient    1990 MRN [id number]   Location [facility] 3SE Attending Anel Sanchez MD   Hosp Day # 2 PCP Esme Higuera

## 2017-03-23 VITALS
RESPIRATION RATE: 16 BRPM | OXYGEN SATURATION: 99 % | HEIGHT: 61 IN | TEMPERATURE: 99 F | HEART RATE: 85 BPM | SYSTOLIC BLOOD PRESSURE: 124 MMHG | WEIGHT: 176.19 LBS | BODY MASS INDEX: 33.27 KG/M2 | DIASTOLIC BLOOD PRESSURE: 78 MMHG

## 2017-03-23 RX ORDER — IBUPROFEN 600 MG/1
600 TABLET ORAL EVERY 6 HOURS
Qty: 30 TABLET | Refills: 0 | Status: SHIPPED | OUTPATIENT
Start: 2017-03-23 | End: 2017-04-02

## 2017-03-23 RX ORDER — HYDROCODONE BITARTRATE AND ACETAMINOPHEN 5; 325 MG/1; MG/1
1 TABLET ORAL EVERY 4 HOURS PRN
Qty: 30 TABLET | Refills: 0 | Status: SHIPPED | OUTPATIENT
Start: 2017-03-23 | End: 2017-04-02

## 2017-03-23 NOTE — PROGRESS NOTES
Discharge order received from MD. All discharge paperwork reviewed. Patient verbalizes understanding. Instructed to make follow up appointment for 6 weeks with Dr. Hilario Don. Patient discharged via wheelchair with infant in [facility].

## 2017-03-24 LAB — BLOOD TYPE BARCODE: 6200

## 2017-03-25 ENCOUNTER — HOSPITAL ENCOUNTER (INPATIENT)
Facility: HOSPITAL | Age: 27
LOS: 2 days | Discharge: HOME | DRG: 776 | End: 2017-03-27
Attending: EMERGENCY MEDICINE | Admitting: OBSTETRICS & GYNECOLOGY
Payer: MEDICAID

## 2017-03-25 ENCOUNTER — TELEPHONE (OUTPATIENT)
Dept: OBGYN CLINIC | Facility: CLINIC | Age: 27
End: 2017-03-25

## 2017-03-25 ENCOUNTER — NURSE ONLY (OUTPATIENT)
Dept: OBGYN CLINIC | Facility: CLINIC | Age: 27
End: 2017-03-25

## 2017-03-25 ENCOUNTER — APPOINTMENT (OUTPATIENT)
Dept: GENERAL RADIOLOGY | Facility: HOSPITAL | Age: 27
DRG: 776 | End: 2017-03-25
Attending: EMERGENCY MEDICINE
Payer: MEDICAID

## 2017-03-25 ENCOUNTER — APPOINTMENT (OUTPATIENT)
Dept: CT IMAGING | Facility: HOSPITAL | Age: 27
DRG: 776 | End: 2017-03-25
Attending: EMERGENCY MEDICINE
Payer: MEDICAID

## 2017-03-25 VITALS — DIASTOLIC BLOOD PRESSURE: 90 MMHG | TEMPERATURE: 103 F | SYSTOLIC BLOOD PRESSURE: 146 MMHG

## 2017-03-25 DIAGNOSIS — N71.9 ENDOMETRITIS: ICD-10-CM

## 2017-03-25 DIAGNOSIS — R50.9 FEVER, UNSPECIFIED FEVER CAUSE: Primary | ICD-10-CM

## 2017-03-25 LAB
ALBUMIN SERPL BCP-MCNC: 2.4 G/DL (ref 3.5–4.8)
ALP SERPL-CCNC: 116 U/L (ref 32–100)
ALT SERPL-CCNC: 52 U/L (ref 14–54)
ANION GAP SERPL CALC-SCNC: 9 MMOL/L (ref 0–18)
AST SERPL-CCNC: 37 U/L (ref 15–41)
BASOPHILS # BLD: 0 K/UL (ref 0–0.2)
BASOPHILS NFR BLD: 0 %
BILIRUB DIRECT SERPL-MCNC: 0.2 MG/DL (ref 0–0.2)
BILIRUB SERPL-MCNC: 1 MG/DL (ref 0.3–1.2)
BILIRUB UR QL: NEGATIVE
BUN SERPL-MCNC: 5 MG/DL (ref 8–20)
BUN/CREAT SERPL: 8.8 (ref 10–20)
CALCIUM SERPL-MCNC: 8.6 MG/DL (ref 8.5–10.5)
CHLORIDE SERPL-SCNC: 102 MMOL/L (ref 95–110)
CLARITY UR: CLEAR
CO2 SERPL-SCNC: 23 MMOL/L (ref 22–32)
COLOR UR: YELLOW
CREAT SERPL-MCNC: 0.57 MG/DL (ref 0.5–1.5)
EOSINOPHIL # BLD: 0 K/UL (ref 0–0.7)
EOSINOPHIL NFR BLD: 0 %
ERYTHROCYTE [DISTWIDTH] IN BLOOD BY AUTOMATED COUNT: 18.7 % (ref 11–15)
GLUCOSE SERPL-MCNC: 97 MG/DL (ref 70–99)
GLUCOSE UR-MCNC: NEGATIVE MG/DL
HCT VFR BLD AUTO: 27.9 % (ref 35–48)
HGB BLD-MCNC: 9 G/DL (ref 12–16)
KETONES UR-MCNC: NEGATIVE MG/DL
LACTATE SERPL-SCNC: 1 MMOL/L (ref 0.5–2.2)
LACTATE SERPL-SCNC: 1.6 MMOL/L (ref 0.5–2.2)
LEUKOCYTE ESTERASE UR QL STRIP.AUTO: NEGATIVE
LYMPHOCYTES # BLD: 0.8 K/UL (ref 1–4)
LYMPHOCYTES NFR BLD: 4 %
MCH RBC QN AUTO: 25.9 PG (ref 27–32)
MCHC RBC AUTO-ENTMCNC: 32.2 G/DL (ref 32–37)
MCV RBC AUTO: 80.4 FL (ref 80–100)
MONOCYTES # BLD: 0.8 K/UL (ref 0–1)
MONOCYTES NFR BLD: 4 %
NEUTROPHILS # BLD AUTO: 18.6 K/UL (ref 1.8–7.7)
NEUTROPHILS NFR BLD: 92 %
NITRITE UR QL STRIP.AUTO: NEGATIVE
OSMOLALITY UR CALC.SUM OF ELEC: 275 MOSM/KG (ref 275–295)
PH UR: 7 [PH] (ref 5–8)
PLATELET # BLD AUTO: 294 K/UL (ref 140–400)
PMV BLD AUTO: 6.8 FL (ref 7.4–10.3)
POTASSIUM SERPL-SCNC: 3.4 MMOL/L (ref 3.3–5.1)
PROT SERPL-MCNC: 6.9 G/DL (ref 5.9–8.4)
PROT UR-MCNC: NEGATIVE MG/DL
RBC # BLD AUTO: 3.48 M/UL (ref 3.7–5.4)
RBC #/AREA URNS AUTO: 2 /HPF
SODIUM SERPL-SCNC: 134 MMOL/L (ref 136–144)
SP GR UR STRIP: 1.01 (ref 1–1.03)
UROBILINOGEN UR STRIP-ACNC: 2
VIT C UR-MCNC: NEGATIVE MG/DL
WBC # BLD AUTO: 20.3 K/UL (ref 4–11)
WBC #/AREA URNS AUTO: 1 /HPF

## 2017-03-25 PROCEDURE — 74177 CT ABD & PELVIS W/CONTRAST: CPT

## 2017-03-25 PROCEDURE — 71010 XR CHEST AP PORTABLE  (CPT=71010): CPT

## 2017-03-25 PROCEDURE — 99211 OFF/OP EST MAY X REQ PHY/QHP: CPT | Performed by: OBSTETRICS & GYNECOLOGY

## 2017-03-25 RX ORDER — IBUPROFEN 600 MG/1
600 TABLET ORAL EVERY 6 HOURS PRN
Status: DISCONTINUED | OUTPATIENT
Start: 2017-03-25 | End: 2017-03-27

## 2017-03-25 RX ORDER — ACETAMINOPHEN 500 MG
1000 TABLET ORAL ONCE
Status: COMPLETED | OUTPATIENT
Start: 2017-03-25 | End: 2017-03-25

## 2017-03-25 RX ORDER — SODIUM CHLORIDE, SODIUM LACTATE, POTASSIUM CHLORIDE, CALCIUM CHLORIDE 600; 310; 30; 20 MG/100ML; MG/100ML; MG/100ML; MG/100ML
INJECTION, SOLUTION INTRAVENOUS CONTINUOUS
Status: DISCONTINUED | OUTPATIENT
Start: 2017-03-25 | End: 2017-03-26

## 2017-03-25 RX ORDER — ONDANSETRON 4 MG/1
4 TABLET, FILM COATED ORAL EVERY 8 HOURS PRN
Status: DISCONTINUED | OUTPATIENT
Start: 2017-03-25 | End: 2017-03-27

## 2017-03-25 RX ORDER — SODIUM CHLORIDE 0.9 % (FLUSH) 0.9 %
10 SYRINGE (ML) INJECTION AS NEEDED
Status: DISCONTINUED | OUTPATIENT
Start: 2017-03-25 | End: 2017-03-27

## 2017-03-25 RX ORDER — ONDANSETRON 2 MG/ML
4 INJECTION INTRAMUSCULAR; INTRAVENOUS EVERY 8 HOURS PRN
Status: DISCONTINUED | OUTPATIENT
Start: 2017-03-25 | End: 2017-03-27

## 2017-03-25 RX ORDER — ACETAMINOPHEN 325 MG/1
650 TABLET ORAL ONCE
Status: COMPLETED | OUTPATIENT
Start: 2017-03-25 | End: 2017-03-25

## 2017-03-25 RX ORDER — ZOLPIDEM TARTRATE 5 MG/1
5 TABLET ORAL NIGHTLY PRN
Status: DISCONTINUED | OUTPATIENT
Start: 2017-03-25 | End: 2017-03-27

## 2017-03-25 NOTE — TELEPHONE ENCOUNTER
Per ALESSANDRO pt to come in for incision check and vitals. Pt informed, states that she is seeing pediatrician now. Pt added to schedule. Pt verbalizes understanding.

## 2017-03-25 NOTE — PROGRESS NOTES
Pt presents complaining of fever and chills. Abdominal incision is clean and dry, steri strips are intact. Pt denies any nausea or vomiting, or cold symptoms. Pt's breast is leaking, pt does not plan to breastfeed. Breasts are hard and slightly red.  Pt sta

## 2017-03-25 NOTE — ED PROVIDER NOTES
Patient Seen in: [facility] AND [facility] Emergency Department    History   Patient presents with:  Fever Sepsis (infectious)    Stated Complaint: post surgical fever/chills    HPI    Patient is a 30-year-old female that had a  5 days ago she was disch Current:/77 mmHg  Pulse 99  Temp(Src) 98.4 °F (36.9 °C) (Temporal)  Resp 20  Ht 154.9 cm (5' 1\")  Wt 72.576 kg  BMI 30.25 kg/m2  SpO2 97%  LMP 07/05/2016 (Exact Date)        Physical Exam    Constitutional: Oriented to person, place, and time. Urine Moderate (*)     Urobilinogen Urine 2.0 (*)     Bacteria Urine Few (*)     All other components within normal limits   CBC W/ DIFFERENTIAL - Abnormal; Notable for the following:     WBC 20.3 (*)     RBC 3.48 (*)     HGB 9.0 (*)     HCT 27.9 (*)     M

## 2017-03-25 NOTE — PROGRESS NOTES
[facility] notified of temp. 101.3, orders reviewed and informed  of toradol 30mg ivp given. Medication given without Md.order.

## 2017-03-25 NOTE — ED NOTES
IV established to left AC, #20, labs sent. IVF infusing, medicated with tylenol. Patient reports tenderness to abdomen, scar c,d,i. Patient denies cough. CXR ready.

## 2017-03-25 NOTE — H&P
31 Linda Lowe Patient Status:  Inpatient    1990 MRN [id number]   Location [facility] 3SE Attending Kaylene Cardenas, DO   Hosp Day # 0 PCP Shahana Sun     Date of Admission:  3/2 CS-LTranv Spinal N Y   4 SAB 07/2016 4w0d             Comments: no D&C   3 Ectopic 2013              Comments: surgery   2 TAB 2011 6w0d             Comments: no complications   1 Term 42/92/64 41w0d  7 lb 8 oz (3.402 kg) F Caesarean EPI N Y      Comments: 22-32 mmol/L   BUN 5 (L) 8-20 mg/dL   Creatinine 0.57 0.50-1.50 mg/dL   Calcium, Total 8.6 8.5-10.5 mg/dL   BUN/CREA Ratio 8.8 (L) 10.0-20.0   Anion Gap 9 0-18   Calculated Osmolality 275 275-295 mOsm/kg   GFR, Non-African American >60 >=60   GFR, - 32.2 32.0-37.0 g/dl   RDW 18.7 (H) 11.0-15.0 %    140-400 K/UL   MPV 6.8 (L) 7.4-10.3 fL   Neutrophil % 92 %   Lymphocyte % 4 %   Monocyte % 4 %   Eosinophil % 0 %   Basophil % 0 %   Neutrophil Absolute 18.6 (H) 1.8-7.7 K/UL   Lymphocyte Absolute 0. gastric mass, obstruction or focal abnormality.  Duodenum unremarkable.    PANCREAS:     No lesion, fluid collection, ductal dilatation, or atrophy.     ADRENALS:     No defined mass or abnormal enlargement.     KIDNEYS:        Normal.  No mass, obstructio diet  6. Pain control with motrin  7.  All questions answered; pt voices understanding of plan of care    Poonam Pierre  3/25/2017  3:58 PM

## 2017-03-25 NOTE — TELEPHONE ENCOUNTER
Pt states that she was discharged on 3/23 after a . Pt states that Delilah Woody couple of times a day\" since discharge she feels like she has a fever and chills. Pt has not taken her temperature as she does not have a thermometer.  Pt states she \"feels co

## 2017-03-26 LAB
BASOPHILS # BLD: 0 K/UL (ref 0–0.2)
BASOPHILS NFR BLD: 0 %
EOSINOPHIL # BLD: 0.1 K/UL (ref 0–0.7)
EOSINOPHIL NFR BLD: 1 %
ERYTHROCYTE [DISTWIDTH] IN BLOOD BY AUTOMATED COUNT: 18.7 % (ref 11–15)
HCT VFR BLD AUTO: 24.3 % (ref 35–48)
HGB BLD-MCNC: 7.8 G/DL (ref 12–16)
LYMPHOCYTES # BLD: 1.5 K/UL (ref 1–4)
LYMPHOCYTES NFR BLD: 8 %
MCH RBC QN AUTO: 25.5 PG (ref 27–32)
MCHC RBC AUTO-ENTMCNC: 31.9 G/DL (ref 32–37)
MCV RBC AUTO: 79.8 FL (ref 80–100)
MONOCYTES # BLD: 1.1 K/UL (ref 0–1)
MONOCYTES NFR BLD: 6 %
NEUTROPHILS # BLD AUTO: 16.4 K/UL (ref 1.8–7.7)
NEUTROPHILS NFR BLD: 86 %
PLATELET # BLD AUTO: 271 K/UL (ref 140–400)
PMV BLD AUTO: 7 FL (ref 7.4–10.3)
RBC # BLD AUTO: 3.05 M/UL (ref 3.7–5.4)
WBC # BLD AUTO: 19.1 K/UL (ref 4–11)

## 2017-03-26 PROCEDURE — 99232 SBSQ HOSP IP/OBS MODERATE 35: CPT | Performed by: OBSTETRICS & GYNECOLOGY

## 2017-03-26 RX ORDER — DOCUSATE SODIUM 100 MG/1
100 CAPSULE, LIQUID FILLED ORAL DAILY
Status: DISCONTINUED | OUTPATIENT
Start: 2017-03-26 | End: 2017-03-27

## 2017-03-26 RX ORDER — HYDROCODONE BITARTRATE AND ACETAMINOPHEN 5; 325 MG/1; MG/1
1 TABLET ORAL EVERY 6 HOURS PRN
Status: DISCONTINUED | OUTPATIENT
Start: 2017-03-26 | End: 2017-03-27

## 2017-03-26 NOTE — PROGRESS NOTES
[facility]    OB/Gyne Post  Section Progress Note      Manolo Donahue Patient Status:  Inpatient    1990 MRN [id number]   Location [facility] 3SE Attending Usman Michael, [address] [facility] Day # 1 PCP Lex Copeland with following issues: Patient Active Problem List:     Previous  section     Antepartum anemia     Nausea/vomiting in pregnancy     Threatened  labor     Ultrasound     Amniotic fluid leaking     Pregnancy     H/O  section     Statu

## 2017-03-26 NOTE — PROGRESS NOTES
[facility] Pharmacy Note:  Therapeutic Interchange for  Paticia Clear (Restricted Antimicrobial)    Rabia Lamas is a 32year old female who has been prescribed Invanz  1gm every 24 hrs. CrCl is estimated creatinine clearance is 112.9 mL/min (based on Cr of 0.57).  In

## 2017-03-26 NOTE — PROGRESS NOTES
Call to Dr Kristine Rojas to clarify orders on file. Ok to discontinue orders for lactic acid lab draw, turn&cough deep breathe, and strict i/o.  Order received for 1x dose of tylenol 650mg

## 2017-03-27 VITALS
BODY MASS INDEX: 30.21 KG/M2 | RESPIRATION RATE: 16 BRPM | HEIGHT: 61 IN | HEART RATE: 76 BPM | TEMPERATURE: 98 F | DIASTOLIC BLOOD PRESSURE: 89 MMHG | SYSTOLIC BLOOD PRESSURE: 144 MMHG | WEIGHT: 160 LBS | OXYGEN SATURATION: 97 %

## 2017-03-27 LAB
BASOPHILS # BLD: 0 K/UL (ref 0–0.2)
BASOPHILS NFR BLD: 0 %
EOSINOPHIL # BLD: 0.2 K/UL (ref 0–0.7)
EOSINOPHIL NFR BLD: 2 %
ERYTHROCYTE [DISTWIDTH] IN BLOOD BY AUTOMATED COUNT: 18.3 % (ref 11–15)
HCT VFR BLD AUTO: 25.3 % (ref 35–48)
HGB BLD-MCNC: 8.3 G/DL (ref 12–16)
LYMPHOCYTES # BLD: 1.4 K/UL (ref 1–4)
LYMPHOCYTES NFR BLD: 15 %
MCH RBC QN AUTO: 26 PG (ref 27–32)
MCHC RBC AUTO-ENTMCNC: 32.7 G/DL (ref 32–37)
MCV RBC AUTO: 79.4 FL (ref 80–100)
MONOCYTES # BLD: 0.7 K/UL (ref 0–1)
MONOCYTES NFR BLD: 8 %
NEUTROPHILS # BLD AUTO: 7.4 K/UL (ref 1.8–7.7)
NEUTROPHILS NFR BLD: 76 %
PLATELET # BLD AUTO: 321 K/UL (ref 140–400)
PMV BLD AUTO: 6.9 FL (ref 7.4–10.3)
RBC # BLD AUTO: 3.19 M/UL (ref 3.7–5.4)
WBC # BLD AUTO: 9.7 K/UL (ref 4–11)

## 2017-03-27 PROCEDURE — 99238 HOSP IP/OBS DSCHRG MGMT 30/<: CPT | Performed by: OBSTETRICS & GYNECOLOGY

## 2017-03-27 NOTE — PROGRESS NOTES
This RN at pt bedside with Ceasar Cruz RN. Dr Valentina Julio on speaker phone discussing plan of care with patient and patient's aunt \"Rukhsana\", patient and aunt verbalize understanding. Orders received for pain medication, and stool softeners.

## 2017-03-27 NOTE — PLAN OF CARE
PAIN - ADULT    • Verbalizes/displays adequate comfort level or patient's stated pain goal Completed        Discharge order received from MD.  Discharge medication form reviewed and given to patient.  Patient informed when to make a follow-up appointment wi

## 2017-03-27 NOTE — PLAN OF CARE
PAIN - ADULT    • Verbalizes/displays adequate comfort level or patient's stated pain goal Progressing

## 2017-03-27 NOTE — PROGRESS NOTES
[facility] HOSP - [facility]    OB/Gyne Post  Section Progress Note      Chio Cochran Patient Status:  Inpatient    1990 MRN [id number]   Location [facility] 3SE Attending Heydi Omalley, [address] [facility] Day # 2 PCP Flaco Bustillo H/O  section     Status post repeat low transverse  section     Status post  section     Fever     Fever, unspecified fever cause     Endometritis     Puerperal endometritis, postpartum condition or complication      Pt overall do

## 2017-03-27 NOTE — PROGRESS NOTES
Pt complaint of miscommunication between RNs, physicians and pt/family during readmission stay. RN offers to talk to charge nurse. Pt/family complies and then requests to talk to physician after. Charge nurse notified.

## 2017-03-27 NOTE — DISCHARGE SUMMARY
Obstetrical Discharge Summary      Date of admission: 3/25/17  Date of discharge:  3/27/17    HPI:  Rabia Lamas is a 32year old [id number] who delievered 3/20/17 via FT RCS who presented to [facility] ER c/o fevers/chills x 2-3 days.   In the ER she was febrile t

## 2017-03-29 NOTE — PAYOR COMM NOTE
Attending Physician: No att. providers found    Review Type: ADMISSION   Reviewer: Cain Severs       Date: March 29, 2017 - 2:23 PM  Payor: Aarti Smith Number: [id number]  Admit date: 3/20/2017  9:01 PM   Admitted from Emergency Dept. 2013              Comments: surgery   2 TAB 2011 6w0d             Comments: no complications   1 Term 42/66/34 41w0d  7 lb 8 oz (3.402 kg) F Caesarean EPI N Y      Comments: \"Sarita\" fetal intolerance to labor, induced for dates        Past Medical Hist ASSESSMENT:    Patient is a [id number] at 37w0d      PLAN:    1.  SROM with history of prior CS  Plan for repeat CS  A discussion was held with Yahir Contreras about the risks of  section including but not limited to: bleeding, infection, damage to vi

## 2017-03-31 NOTE — PAYOR COMM NOTE
Attending Physician: No att. providers found    Review Type: ADMISSION   Reviewer: Doreen Blue       Date: March 31, 2017 - 1:20 PM  Payor: Edouard Owens Number: [id number]  Admit date: 3/25/2017 10:18 AM   Admitted from Emergency Dept. for stated complaint: post surgical fever/chills  Other systems are as noted in HPI. Constitutional and vital signs reviewed. All other systems reviewed and negative except as noted above.     PSFH elements reviewed from today and agreed except as oth normal. Judgment and thought content normal.   Nursing note and vitals reviewed.           ED Course     Labs Reviewed   BASIC METABOLIC PANEL (8) - Abnormal; Notable for the following:     Sodium 134 (*)     BUN 5 (*)     BUN/CREA Ratio 8.8 (*)     All oth . 2. Enlarged postpartum uterus. Small amount residual fluid in the endometrial canal. 3. Otherwise, negative study.              Disposition and Plan     Clinical Impression:  Fever, unspecified fever cause  (primary encounter diagnosis)  Endometr Problem List    Fever      Fever, unspecified fever cause      Endometritis      Puerperal endometritis, postpartum condition or complication      Status post repeat low transverse  section      Status post  section      H/O  sectio mouth daily with breakfast. Disp:  Rfl:  3/19/2017 at Unknown time   Prenatal MV-Min-Fe Fum-FA-DHA (PRENATAL+DHA) 28-0.975 & 200 MG Oral Misc Take 1 tablet by mouth daily.  Disp: 30 each Rfl: 4 3/19/2017 at 2100     Allergies: No Known Allergies    OBJECTIV Leukocyte Esterase Urine Negative Negative   Ascorbic Acid Urine Negative Negative mg/dL   WBC Urine 1 0-5 /HPF   RBC URINE 2 0-3 /HPF   Bacteria Urine Few (A) None Seen /HPF   -RAINBOW DRAW BLUE   Result Value Ref Range   Hold Blue Auto Resulted    -NOEL 11:17            =====  CONCLUSION: Borderline cardiomegaly otherwise unremarkable examination.           PROCEDURE:  CT ABDOMEN PELVIS IV CONTRAST NO ORAL (ER)      COMPARISON:None.      INDICATIONS:  Patient has post surgical fever and chills,     OTHER:           Negative.         Dictated by (CST): Morris Medina MD on 3/25/2017 at 12:59        Approved by (CST): Morris Medina MD on 3/25/2017 at 13:08            =====  CONCLUSION:   1. Postop changes of .   2. Enlarge

## 2017-04-02 ENCOUNTER — HOSPITAL ENCOUNTER (EMERGENCY)
Facility: HOSPITAL | Age: 27
Discharge: HOME | End: 2017-04-02
Attending: EMERGENCY MEDICINE
Payer: MEDICAID

## 2017-04-02 VITALS
DIASTOLIC BLOOD PRESSURE: 80 MMHG | RESPIRATION RATE: 16 BRPM | WEIGHT: 160 LBS | TEMPERATURE: 98 F | HEART RATE: 106 BPM | SYSTOLIC BLOOD PRESSURE: 111 MMHG | BODY MASS INDEX: 30 KG/M2 | OXYGEN SATURATION: 99 %

## 2017-04-02 DIAGNOSIS — R30.0 DYSURIA: Primary | ICD-10-CM

## 2017-04-02 PROCEDURE — 87147 CULTURE TYPE IMMUNOLOGIC: CPT | Performed by: EMERGENCY MEDICINE

## 2017-04-02 PROCEDURE — 81001 URINALYSIS AUTO W/SCOPE: CPT | Performed by: EMERGENCY MEDICINE

## 2017-04-02 PROCEDURE — 87086 URINE CULTURE/COLONY COUNT: CPT | Performed by: EMERGENCY MEDICINE

## 2017-04-02 PROCEDURE — 99283 EMERGENCY DEPT VISIT LOW MDM: CPT

## 2017-04-02 RX ORDER — CIPROFLOXACIN 500 MG/1
500 TABLET, FILM COATED ORAL 2 TIMES DAILY
Qty: 6 TABLET | Refills: 0 | Status: SHIPPED | OUTPATIENT
Start: 2017-04-02 | End: 2017-04-05

## 2017-04-02 NOTE — ED PROVIDER NOTES
Patient Seen in: [facility] Emergency Department    History   Patient presents with:  Urinary Symptoms (urologic)    Stated Complaint: uti symptoms, cloudy urine, fever - pt 2 weeks postpartum    HPI    Patient is a pleasant 44-year-old female, appr Exam    Vital signs are reviewed noted as documented in the nursing chart. GENERAL: Patient is awake and alert, resting comfortably on the cart, in no apparent distress. HEENT: Head is without evidence of trauma. Extraocular muscles are intact.   Stephany Wilkinson UTI.  Patient agrees to return immediately for reevaluation of any problems, worsening symptoms, or as discussed. Patient will otherwise keep her scheduled doctor's appointment tomorrow.     Patient ambulated freely and was subsequently discharged without

## 2017-05-01 ENCOUNTER — POSTPARTUM (OUTPATIENT)
Dept: OBGYN CLINIC | Facility: CLINIC | Age: 27
End: 2017-05-01

## 2017-05-01 VITALS
DIASTOLIC BLOOD PRESSURE: 93 MMHG | HEART RATE: 81 BPM | WEIGHT: 154.31 LBS | SYSTOLIC BLOOD PRESSURE: 135 MMHG | BODY MASS INDEX: 29 KG/M2

## 2017-05-01 DIAGNOSIS — Z30.09 COUNSELING FOR BIRTH CONTROL REGARDING INTRAUTERINE DEVICE (IUD): Primary | ICD-10-CM

## 2017-05-01 PROBLEM — O99.019 ANTEPARTUM ANEMIA: Status: RESOLVED | Noted: 2017-01-18 | Resolved: 2017-03-20

## 2017-05-01 PROBLEM — O99.019 ANTEPARTUM ANEMIA (HCC): Status: RESOLVED | Noted: 2017-01-18 | Resolved: 2017-03-20

## 2017-05-01 PROBLEM — Z34.90 PREGNANCY, OBSTETRICAL CARE (HCC): Status: RESOLVED | Noted: 2017-02-16 | Resolved: 2017-03-20

## 2017-05-01 PROBLEM — Z98.891 STATUS POST CESAREAN SECTION: Status: RESOLVED | Noted: 2017-03-21 | Resolved: 2017-03-20

## 2017-05-01 PROBLEM — O21.9 NAUSEA/VOMITING IN PREGNANCY: Status: RESOLVED | Noted: 2017-01-18 | Resolved: 2017-03-20

## 2017-05-01 PROBLEM — Z34.90 PREGNANCY, OBSTETRICAL CARE: Status: RESOLVED | Noted: 2017-02-16 | Resolved: 2017-03-20

## 2017-05-01 PROBLEM — O21.9 NAUSEA/VOMITING IN PREGNANCY (HCC): Status: RESOLVED | Noted: 2017-01-18 | Resolved: 2017-03-20

## 2017-05-01 PROCEDURE — 0503F POSTPARTUM CARE VISIT: CPT | Performed by: OBSTETRICS & GYNECOLOGY

## 2017-05-01 RX ORDER — MISOPROSTOL 200 UG/1
TABLET ORAL
Qty: 2 TABLET | Refills: 0 | Status: SHIPPED | OUTPATIENT
Start: 2017-05-01 | End: 2017-05-18

## 2017-05-01 NOTE — PROGRESS NOTES
Here for post-partum visit. Pt had a  delivery on 3/21/17 with Dr. Jaxon Wilson. Infant- girl doing well. There were no complications at time of CS. She was re-admitted for Endometritis. Pt is bottle feeding. Pt desire Mirena IUD for contraception. have not been established at this time, although based on the hormone concentration, we believe it should be similar bleeding pattern and amenorrhea rate as Mirena IUD. Insertion procedure was described.  Risks of uterine perforation, bleeding, infection

## 2017-05-17 ENCOUNTER — APPOINTMENT (OUTPATIENT)
Dept: LAB | Facility: HOSPITAL | Age: 27
End: 2017-05-17
Attending: OBSTETRICS & GYNECOLOGY
Payer: MEDICAID

## 2017-05-17 DIAGNOSIS — Z30.09 COUNSELING FOR BIRTH CONTROL REGARDING INTRAUTERINE DEVICE (IUD): ICD-10-CM

## 2017-05-17 PROCEDURE — 84703 CHORIONIC GONADOTROPIN ASSAY: CPT

## 2017-05-17 PROCEDURE — 36415 COLL VENOUS BLD VENIPUNCTURE: CPT

## 2017-05-18 ENCOUNTER — OFFICE VISIT (OUTPATIENT)
Dept: OBGYN CLINIC | Facility: CLINIC | Age: 27
End: 2017-05-18

## 2017-05-18 VITALS
BODY MASS INDEX: 29 KG/M2 | WEIGHT: 154.81 LBS | HEART RATE: 86 BPM | SYSTOLIC BLOOD PRESSURE: 126 MMHG | DIASTOLIC BLOOD PRESSURE: 85 MMHG

## 2017-05-18 DIAGNOSIS — Z30.430 ENCOUNTER FOR INSERTION OF INTRAUTERINE CONTRACEPTIVE DEVICE: Primary | ICD-10-CM

## 2017-05-18 PROCEDURE — 58300 INSERT INTRAUTERINE DEVICE: CPT | Performed by: OBSTETRICS & GYNECOLOGY

## 2017-05-18 NOTE — PROCEDURES
IUD Insertion     Pregnancy Results: negative from blood test   Birth control method(s) used: None. Consent signed. Procedure discussed with the patient in detail including indication, risks, benefits, alternatives and complications.     Pelvic Exam Find

## 2017-06-15 ENCOUNTER — OFFICE VISIT (OUTPATIENT)
Dept: OBGYN CLINIC | Facility: CLINIC | Age: 27
End: 2017-06-15

## 2017-06-15 VITALS
WEIGHT: 152 LBS | DIASTOLIC BLOOD PRESSURE: 76 MMHG | HEART RATE: 80 BPM | SYSTOLIC BLOOD PRESSURE: 118 MMHG | BODY MASS INDEX: 29 KG/M2

## 2017-06-15 DIAGNOSIS — Z30.431 IUD CHECK UP: Primary | ICD-10-CM

## 2017-06-15 PROCEDURE — 99213 OFFICE O/P EST LOW 20 MIN: CPT | Performed by: OBSTETRICS & GYNECOLOGY

## 2017-06-15 NOTE — PROGRESS NOTES
Dannielle Almonte is a 32year old female [id number] Patient's last menstrual period was 05/25/2017. Patient presents with:  IUD: IUD Check Inserted 5-18-17  Patient presents for IUD string check. She has had some irregular spotting since placement.  She also not cyanosis  Psychiatric: Appropriate mood and affect    Pelvic Exam:  External Genitalia: normal appearance, hair distribution, and no lesions  Urethral Meatus:  normal in size, location, without lesions and prolapse  Bladder:  No fullness, masses or tendern

## 2017-08-18 ENCOUNTER — HOSPITAL ENCOUNTER (OUTPATIENT)
Age: 27
Discharge: HOME | End: 2017-08-18
Attending: FAMILY MEDICINE
Payer: COMMERCIAL

## 2017-08-18 VITALS
HEIGHT: 65 IN | SYSTOLIC BLOOD PRESSURE: 122 MMHG | DIASTOLIC BLOOD PRESSURE: 68 MMHG | HEART RATE: 84 BPM | RESPIRATION RATE: 14 BRPM | BODY MASS INDEX: 25.83 KG/M2 | TEMPERATURE: 98 F | OXYGEN SATURATION: 99 % | WEIGHT: 155 LBS

## 2017-08-18 DIAGNOSIS — N76.0 VAGINITIS AND VULVOVAGINITIS: Primary | ICD-10-CM

## 2017-08-18 LAB
POCT BILIRUBIN URINE: NEGATIVE
POCT BLOOD URINE: NEGATIVE
POCT GLUCOSE URINE: NEGATIVE MG/DL
POCT KETONE URINE: NEGATIVE MG/DL
POCT LEUKOCYTE ESTERASE URINE: NEGATIVE
POCT NITRITE URINE: NEGATIVE
POCT PH URINE: 7 (ref 5–8)
POCT PROTEIN URINE: NEGATIVE MG/DL
POCT SPECIFIC GRAVITY URINE: 1.02
POCT URINE PREGNANCY: NEGATIVE
POCT UROBILINOGEN URINE: 0.2 MG/DL

## 2017-08-18 PROCEDURE — 81002 URINALYSIS NONAUTO W/O SCOPE: CPT | Performed by: FAMILY MEDICINE

## 2017-08-18 PROCEDURE — 99214 OFFICE O/P EST MOD 30 MIN: CPT

## 2017-08-18 PROCEDURE — 81025 URINE PREGNANCY TEST: CPT | Performed by: FAMILY MEDICINE

## 2017-08-18 PROCEDURE — 87510 GARDNER VAG DNA DIR PROBE: CPT | Performed by: FAMILY MEDICINE

## 2017-08-18 PROCEDURE — 87591 N.GONORRHOEAE DNA AMP PROB: CPT | Performed by: FAMILY MEDICINE

## 2017-08-18 PROCEDURE — 87480 CANDIDA DNA DIR PROBE: CPT | Performed by: FAMILY MEDICINE

## 2017-08-18 PROCEDURE — 87491 CHLMYD TRACH DNA AMP PROBE: CPT | Performed by: FAMILY MEDICINE

## 2017-08-18 PROCEDURE — 87660 TRICHOMONAS VAGIN DIR PROBE: CPT | Performed by: FAMILY MEDICINE

## 2017-08-18 RX ORDER — METRONIDAZOLE 500 MG/1
500 TABLET ORAL 2 TIMES DAILY
Qty: 14 TABLET | Refills: 0 | Status: SHIPPED | OUTPATIENT
Start: 2017-08-18 | End: 2017-08-25

## 2017-08-18 NOTE — ED PROVIDER NOTES
Patient Seen in: [address]    History   Patient presents with:  Eval-G (gynecologic)    Stated Complaint: eval g 3 days    HPI    Complains of 3-5 days of thin white vaginal discharge.   Patient states discharge is [employer] URINALYSIS DIPSTICK - Abnormal; Notable for the following:        Result Value    Urine Clarity Slightly cloudy (*)     All other components within normal limits   POCT PREGNANCY, URINE - Normal   CHLAMYDIA/GONOCOCCUS, RODRIGO   VAGINITIS/VAGINOSIS, DNA PROBE

## 2017-08-20 LAB
C TRACH DNA SPEC QL NAA+PROBE: POSITIVE
N GONORRHOEA DNA SPEC QL NAA+PROBE: NEGATIVE

## 2017-08-20 RX ORDER — AZITHROMYCIN 1 G
1 PACKET (EA) ORAL ONCE
Qty: 1 EACH | Refills: 0 | Status: SHIPPED | OUTPATIENT
Start: 2017-08-20 | End: 2017-08-20

## 2017-09-25 ENCOUNTER — APPOINTMENT (OUTPATIENT)
Dept: LAB | Facility: HOSPITAL | Age: 27
End: 2017-09-25
Attending: OBSTETRICS & GYNECOLOGY
Payer: COMMERCIAL

## 2017-09-25 ENCOUNTER — OFFICE VISIT (OUTPATIENT)
Dept: OBGYN CLINIC | Facility: CLINIC | Age: 27
End: 2017-09-25

## 2017-09-25 VITALS
DIASTOLIC BLOOD PRESSURE: 70 MMHG | SYSTOLIC BLOOD PRESSURE: 111 MMHG | HEART RATE: 83 BPM | BODY MASS INDEX: 26 KG/M2 | WEIGHT: 157 LBS

## 2017-09-25 DIAGNOSIS — Z11.3 SCREEN FOR STD (SEXUALLY TRANSMITTED DISEASE): ICD-10-CM

## 2017-09-25 DIAGNOSIS — Z01.419 ENCOUNTER FOR GYNECOLOGICAL EXAMINATION WITHOUT ABNORMAL FINDING: Primary | ICD-10-CM

## 2017-09-25 PROCEDURE — 87591 N.GONORRHOEAE DNA AMP PROB: CPT

## 2017-09-25 PROCEDURE — 99395 PREV VISIT EST AGE 18-39: CPT | Performed by: OBSTETRICS & GYNECOLOGY

## 2017-09-25 PROCEDURE — 87491 CHLMYD TRACH DNA AMP PROBE: CPT

## 2017-09-25 NOTE — H&P
HPI:  The patient is a 33 yo F who presents for WWE. Went to UC 2 weeks ago for discharge and noted to have Chlamydia. Took 1 g azithro and threw it up thereafter (unclear when). Monthly menses. Sometimes heavy or light now with IUD placed.       LPS: BP: 111/70   Pulse: 83       PHYSICAL EXAM:   Constitutional: well developed, well nourished  Head/Face: normocephalic  Neck/Thyroid: thyroid symmetric, no thyromegaly, no nodules, no adenopathy  Heart: Regular rate and rhythm   Lungs: clear to ascultati

## 2017-09-26 LAB
C TRACH DNA SPEC QL NAA+PROBE: NEGATIVE
N GONORRHOEA DNA SPEC QL NAA+PROBE: NEGATIVE

## 2017-10-03 ENCOUNTER — TELEPHONE (OUTPATIENT)
Dept: OBGYN CLINIC | Facility: CLINIC | Age: 27
End: 2017-10-03

## 2018-02-02 ENCOUNTER — HOSPITAL ENCOUNTER (OUTPATIENT)
Age: 28
Discharge: HOME | End: 2018-02-02
Attending: FAMILY MEDICINE
Payer: MEDICAID

## 2018-02-02 VITALS
WEIGHT: 154 LBS | TEMPERATURE: 98 F | DIASTOLIC BLOOD PRESSURE: 71 MMHG | BODY MASS INDEX: 29.07 KG/M2 | RESPIRATION RATE: 22 BRPM | OXYGEN SATURATION: 100 % | HEART RATE: 80 BPM | HEIGHT: 61 IN | SYSTOLIC BLOOD PRESSURE: 133 MMHG

## 2018-02-02 DIAGNOSIS — N76.0 BACTERIAL VAGINITIS: Primary | ICD-10-CM

## 2018-02-02 DIAGNOSIS — B96.89 BACTERIAL VAGINITIS: Primary | ICD-10-CM

## 2018-02-02 PROCEDURE — 99214 OFFICE O/P EST MOD 30 MIN: CPT

## 2018-02-02 PROCEDURE — 99213 OFFICE O/P EST LOW 20 MIN: CPT

## 2018-02-02 RX ORDER — METRONIDAZOLE 500 MG/1
500 TABLET ORAL 2 TIMES DAILY
Qty: 14 TABLET | Refills: 0 | Status: SHIPPED | OUTPATIENT
Start: 2018-02-02 | End: 2018-02-09

## 2018-02-02 NOTE — ED NOTES
Pt discharged home, prescription electronically sent to the pharmacy, pt instructed to follow up with her OBGYN if symptoms do not improve

## 2018-02-02 NOTE — ED INITIAL ASSESSMENT (HPI)
Pt here with complaints of a possible Bacteria Vaginosis infection, pt has been having itching, dishcharge, and odor in her vaginal area for about a week now, pt states she has had this before and feels its BV again, pt denies any fevers or abd pain

## 2018-02-02 NOTE — ED PROVIDER NOTES
Patient Seen in: [address]    History   Patient presents with:  Eval-G (gynecologic)    Stated Complaint: Nela Tomas     HPI    Pt is a 33 yo who presents with a 1 week h/o yellow white thick discharge with an o to f/u with her GYNE if sx do not resolve. Go to the ER if sx worsen.           Disposition and Plan     Clinical Impression:  Bacterial vaginitis  (primary encounter diagnosis)    Disposition:  Discharge  2/2/2018 12:59 pm    Follow-up:  Kentrell Forrester

## 2018-03-26 ENCOUNTER — TELEPHONE (OUTPATIENT)
Dept: PEDIATRICS CLINIC | Facility: CLINIC | Age: 28
End: 2018-03-26

## 2018-03-26 NOTE — TELEPHONE ENCOUNTER
Pt c/o vaginal discharge and odor. White in color. Pt denies itching and discomfort. Pt states it started 3 days ago. Appt accepted tomorrow with SIA for vaginal cx.

## 2018-03-27 ENCOUNTER — OFFICE VISIT (OUTPATIENT)
Dept: OBGYN CLINIC | Facility: CLINIC | Age: 28
End: 2018-03-27

## 2018-03-27 VITALS
HEART RATE: 80 BPM | SYSTOLIC BLOOD PRESSURE: 109 MMHG | WEIGHT: 163 LBS | DIASTOLIC BLOOD PRESSURE: 71 MMHG | BODY MASS INDEX: 31 KG/M2

## 2018-03-27 DIAGNOSIS — N92.1 BREAKTHROUGH BLEEDING WITH IUD: ICD-10-CM

## 2018-03-27 DIAGNOSIS — N92.1 PROLONGED MENSTRUATION: Primary | ICD-10-CM

## 2018-03-27 DIAGNOSIS — Z97.5 BREAKTHROUGH BLEEDING WITH IUD: ICD-10-CM

## 2018-03-27 PROCEDURE — 99213 OFFICE O/P EST LOW 20 MIN: CPT | Performed by: OBSTETRICS & GYNECOLOGY

## 2018-03-27 NOTE — H&P
HPI:  The patient is a 66-year-old female who presents to discuss prolonged menstruation and vaginal odor. The patient states she had a Mirena placed last summer and has had prolonged bleeds for up to 3 weeks since.   She states that at the end of [employer]:  Constitutional:  Denies fevers and chills   Cardiovascular:  denies chest pain or palpitations  Respiratory:  denies shortness of breath  Gastrointestinal:  denies heartburn, abdominal pain, diarrhea or constipation  Genitourinary:  denies dysuri of OCPs to help regulate her bleeding.

## 2018-03-28 ENCOUNTER — TELEPHONE (OUTPATIENT)
Dept: OBGYN CLINIC | Facility: CLINIC | Age: 28
End: 2018-03-28

## 2018-03-28 RX ORDER — METRONIDAZOLE 500 MG/1
500 TABLET ORAL 2 TIMES DAILY
Qty: 14 TABLET | Refills: 0 | Status: SHIPPED | OUTPATIENT
Start: 2018-03-28 | End: 2018-05-02

## 2018-03-28 NOTE — TELEPHONE ENCOUNTER
Pt informed of MAZs recs and verbalized understanding. Flagyl 500mg BID x 7 days sent to pharmacy. Pt informed we will contact her once the yeast portion of vaginal culture is resulted.

## 2018-03-29 LAB
GENITAL VAGINOSIS SCREEN: POSITIVE
TRICHOMONAS SCREEN: NEGATIVE

## 2018-04-23 ENCOUNTER — TELEPHONE (OUTPATIENT)
Dept: OBGYN CLINIC | Facility: CLINIC | Age: 28
End: 2018-04-23

## 2018-04-23 NOTE — TELEPHONE ENCOUNTER
Pt calling to report that she would like to have Mirena IUD removed. Pt had IUD placed by Tito Le on 5/18/17. Pt stated that since having IUD inserted she has been having prolonged bleeding and frequent bacterial vaginosis infections.  Pt stated that since inse

## 2018-04-24 ENCOUNTER — HOSPITAL ENCOUNTER (OUTPATIENT)
Age: 28
Discharge: HOME | End: 2018-04-24
Attending: EMERGENCY MEDICINE
Payer: MEDICAID

## 2018-04-24 VITALS
TEMPERATURE: 100 F | SYSTOLIC BLOOD PRESSURE: 131 MMHG | OXYGEN SATURATION: 100 % | RESPIRATION RATE: 22 BRPM | HEART RATE: 97 BPM | DIASTOLIC BLOOD PRESSURE: 81 MMHG

## 2018-04-24 DIAGNOSIS — N76.0 ACUTE VAGINITIS: Primary | ICD-10-CM

## 2018-04-24 PROCEDURE — 99214 OFFICE O/P EST MOD 30 MIN: CPT

## 2018-04-24 PROCEDURE — 87591 N.GONORRHOEAE DNA AMP PROB: CPT | Performed by: EMERGENCY MEDICINE

## 2018-04-24 PROCEDURE — 81025 URINE PREGNANCY TEST: CPT

## 2018-04-24 PROCEDURE — 87106 FUNGI IDENTIFICATION YEAST: CPT | Performed by: EMERGENCY MEDICINE

## 2018-04-24 PROCEDURE — 87491 CHLMYD TRACH DNA AMP PROBE: CPT | Performed by: EMERGENCY MEDICINE

## 2018-04-24 PROCEDURE — 87808 TRICHOMONAS ASSAY W/OPTIC: CPT | Performed by: EMERGENCY MEDICINE

## 2018-04-24 PROCEDURE — 87205 SMEAR GRAM STAIN: CPT | Performed by: EMERGENCY MEDICINE

## 2018-04-24 RX ORDER — METRONIDAZOLE 500 MG/1
500 TABLET ORAL 2 TIMES DAILY
Qty: 14 TABLET | Refills: 0 | Status: SHIPPED | OUTPATIENT
Start: 2018-04-24 | End: 2018-05-01

## 2018-04-25 NOTE — ED PROVIDER NOTES
Patient Seen in: [address]    History   Patient presents with:  Vaginal Problem    Stated Complaint: SICK     HPI    The patient is a 51-year-old female without significant past medical history who presents with complai ED Course as of Apr 24 1954  ------------------------------------------------------------       MDM       Patient has an apparent recurrence of her bacterial vaginosis.   We will treat with metronidazole have the patient follow-up with PMD        Disp

## 2018-04-25 NOTE — ED INITIAL ASSESSMENT (HPI)
Patient comes in with complaint of yeast infection which started last Friday. Symptoms include discharge, itching.

## 2018-05-02 ENCOUNTER — OFFICE VISIT (OUTPATIENT)
Dept: OBGYN CLINIC | Facility: CLINIC | Age: 28
End: 2018-05-02

## 2018-05-02 VITALS — HEART RATE: 71 BPM | SYSTOLIC BLOOD PRESSURE: 110 MMHG | DIASTOLIC BLOOD PRESSURE: 75 MMHG

## 2018-05-02 DIAGNOSIS — Z30.432 ENCOUNTER FOR REMOVAL OF INTRAUTERINE CONTRACEPTIVE DEVICE: Primary | ICD-10-CM

## 2018-05-02 PROCEDURE — 58301 REMOVE INTRAUTERINE DEVICE: CPT | Performed by: OBSTETRICS & GYNECOLOGY

## 2018-05-02 NOTE — PROCEDURES
IUD Removal     Consent signed. Procedure discussed with the patient in detail including indication, risks, benefits, alternatives and complications.     Pelvic Exam Findings:  Lesion description:  IUD strings seen from cervix    Procedure:  Speculum place

## 2018-07-12 ENCOUNTER — HOSPITAL ENCOUNTER (OUTPATIENT)
Age: 28
Discharge: HOME | End: 2018-07-12
Attending: FAMILY MEDICINE
Payer: MEDICAID

## 2018-07-12 VITALS
HEART RATE: 70 BPM | TEMPERATURE: 98 F | RESPIRATION RATE: 16 BRPM | WEIGHT: 162.94 LBS | BODY MASS INDEX: 31 KG/M2 | SYSTOLIC BLOOD PRESSURE: 131 MMHG | OXYGEN SATURATION: 100 % | DIASTOLIC BLOOD PRESSURE: 85 MMHG

## 2018-07-12 DIAGNOSIS — N89.8 VAGINAL DISCHARGE: Primary | ICD-10-CM

## 2018-07-12 LAB
POCT BILIRUBIN URINE: NEGATIVE
POCT BLOOD URINE: NEGATIVE
POCT GLUCOSE URINE: NEGATIVE MG/DL
POCT KETONE URINE: NEGATIVE MG/DL
POCT LEUKOCYTE ESTERASE URINE: NEGATIVE
POCT NITRITE URINE: NEGATIVE
POCT PH URINE: 7 (ref 5–8)
POCT PROTEIN URINE: NEGATIVE MG/DL
POCT SPECIFIC GRAVITY URINE: 1.02
POCT URINE COLOR: YELLOW
POCT URINE PREGNANCY: NEGATIVE
POCT UROBILINOGEN URINE: 1 MG/DL

## 2018-07-12 PROCEDURE — 81025 URINE PREGNANCY TEST: CPT | Performed by: FAMILY MEDICINE

## 2018-07-12 PROCEDURE — 81002 URINALYSIS NONAUTO W/O SCOPE: CPT | Performed by: FAMILY MEDICINE

## 2018-07-12 PROCEDURE — 99214 OFFICE O/P EST MOD 30 MIN: CPT

## 2018-07-12 PROCEDURE — 87510 GARDNER VAG DNA DIR PROBE: CPT | Performed by: FAMILY MEDICINE

## 2018-07-12 PROCEDURE — 87660 TRICHOMONAS VAGIN DIR PROBE: CPT | Performed by: FAMILY MEDICINE

## 2018-07-12 PROCEDURE — 87480 CANDIDA DNA DIR PROBE: CPT | Performed by: FAMILY MEDICINE

## 2018-07-12 RX ORDER — FLUCONAZOLE 150 MG/1
150 TABLET ORAL ONCE
Qty: 1 TABLET | Refills: 0 | Status: SHIPPED | OUTPATIENT
Start: 2018-07-12 | End: 2018-07-12

## 2018-07-12 NOTE — ED PROVIDER NOTES
Patient Seen in: [address] [facility]    History   Patient presents with:  Eval-G (gynecologic)    Stated Complaint: eval-g x1 day    HPI  32year old female  L2 A1 M2 presents to immediate care with a 1 day history of a vaginal Genitourinary:   Genitourinary Comments: Pelvic exam done in the presence of nurse    Speculum exam   Whitish discharge thick noted in the vaginal fornix  No active discharge from the cervical os    Pt deferred bimanual exam         Neurological: She is

## 2018-07-13 RX ORDER — METRONIDAZOLE 7.5 MG/G
1 GEL VAGINAL NIGHTLY
Qty: 70 G | Refills: 0 | Status: SHIPPED | OUTPATIENT
Start: 2018-07-13 | End: 2018-07-18

## 2018-07-13 NOTE — ED NOTES
Pt called, stating that she was seen here in [address] [place] [address] yesterday and insurance is not covering the Diflucan and asking is something else can be prescribed. Pt cultures are not resulted yet. Dr. Hannah Wasserman notified, per Dr. Hannah Wasserman, have pt try OTC monostat.  MD recommendat

## 2018-09-27 ENCOUNTER — TELEPHONE (OUTPATIENT)
Dept: OBGYN CLINIC | Facility: CLINIC | Age: 28
End: 2018-09-27

## 2018-09-27 DIAGNOSIS — Z32.00 PREGNANCY EXAMINATION OR TEST, PREGNANCY UNCONFIRMED: Primary | ICD-10-CM

## 2018-09-27 NOTE — TELEPHONE ENCOUNTER
Pt states that her period was due 9/17/18 and she has not received it. Pt did three upt test the last one being done Monday and they were negative. Pt states that she always gets her period every 4 weeks. Pt had an IUD removed 5/2018.   Pt states that sh

## 2018-10-29 ENCOUNTER — TELEPHONE (OUTPATIENT)
Dept: OBGYN CLINIC | Facility: CLINIC | Age: 28
End: 2018-10-29

## 2018-10-29 NOTE — TELEPHONE ENCOUNTER
Attempted to call pt several times and after several rings call disconnects. Pt has not completed [facility] ordered 9/2018. No response letter sent. Labs postponed for 1 month.

## 2018-11-28 ENCOUNTER — OFFICE VISIT (OUTPATIENT)
Dept: OBGYN CLINIC | Facility: CLINIC | Age: 28
End: 2018-11-28
Payer: MEDICAID

## 2018-11-28 ENCOUNTER — APPOINTMENT (OUTPATIENT)
Dept: LAB | Age: 28
End: 2018-11-28
Attending: OBSTETRICS & GYNECOLOGY
Payer: MEDICAID

## 2018-11-28 VITALS
WEIGHT: 172 LBS | SYSTOLIC BLOOD PRESSURE: 107 MMHG | HEART RATE: 94 BPM | HEIGHT: 61 IN | BODY MASS INDEX: 32.47 KG/M2 | DIASTOLIC BLOOD PRESSURE: 76 MMHG

## 2018-11-28 DIAGNOSIS — Z30.011 BCP (BIRTH CONTROL PILLS) INITIATION: ICD-10-CM

## 2018-11-28 DIAGNOSIS — Z01.419 ENCOUNTER FOR GYNECOLOGICAL EXAMINATION WITHOUT ABNORMAL FINDING: Primary | ICD-10-CM

## 2018-11-28 DIAGNOSIS — Z98.890 STATUS POST ELECTIVE ABORTION: ICD-10-CM

## 2018-11-28 PROCEDURE — 36415 COLL VENOUS BLD VENIPUNCTURE: CPT

## 2018-11-28 PROCEDURE — 99213 OFFICE O/P EST LOW 20 MIN: CPT | Performed by: OBSTETRICS & GYNECOLOGY

## 2018-11-28 PROCEDURE — 99395 PREV VISIT EST AGE 18-39: CPT | Performed by: OBSTETRICS & GYNECOLOGY

## 2018-11-28 PROCEDURE — 84702 CHORIONIC GONADOTROPIN TEST: CPT

## 2018-11-28 RX ORDER — NORETHINDRONE ACETATE AND ETHINYL ESTRADIOL 1MG-20(21)
1 KIT ORAL DAILY
Qty: 1 PACKAGE | Refills: 12 | Status: SHIPPED | OUTPATIENT
Start: 2018-11-28 | End: 2018-12-17

## 2018-11-28 NOTE — PROGRESS NOTES
Well Woman Exam    HPI:  The patient is a 33yo female who presents for annual exam. She states she had an elective AB last month on Oct 6th. She has been bleeding ever since. She denies pain or fevers. She does notice a vaginal odor with the blood.  She den Sexual activity: Yes        Birth control/protection: Mirena        Comment: none    Other Topics      Concerns:        Not on file    Social History Narrative      Not on file      FAMILY HISTORY:  Family History   Problem Relation Age of Onset   • Hype discharge, scant dark brown blood in vault   Cervix:  Normal without tenderness on motion, cervix closed   Uterus: normal in size, contour, position, mobility, without tenderness  Adnexa: normal without masses or tenderness  Perineum: normal    Assessment/

## 2018-11-30 ENCOUNTER — TELEPHONE (OUTPATIENT)
Dept: OBGYN CLINIC | Facility: CLINIC | Age: 28
End: 2018-11-30

## 2018-11-30 DIAGNOSIS — Z98.890 STATUS POST ELECTIVE ABORTION: ICD-10-CM

## 2018-11-30 DIAGNOSIS — O03.4 RETAINED PRODUCTS OF CONCEPTION AFTER MISCARRIAGE: Primary | ICD-10-CM

## 2018-11-30 NOTE — TELEPHONE ENCOUNTER
Pt informed of below. Pt advised to not start OCP until we hear back from [facility] with recs. Pt also advised no unprotected IC. Pt verbalized understanding. Message to [facility] to please advise.

## 2018-11-30 NOTE — TELEPHONE ENCOUNTER
hcg elevated.   Please speak to [facility] on Monday if she'd like to proceed with US

## 2018-11-30 NOTE — TELEPHONE ENCOUNTER
Message to [facility] (On call) to please review pts HCG quant from 11/28 in [facility] absence. Pt saw Debbie Shay on 11/28 for s/p eAB with AUB. Per [facility] notes, \"if quant negative can start OCPs. If quant positive will consider ordering US for retained placenta\".  Please ad

## 2018-12-04 ENCOUNTER — APPOINTMENT (OUTPATIENT)
Dept: LAB | Facility: HOSPITAL | Age: 28
End: 2018-12-04
Attending: OBSTETRICS & GYNECOLOGY
Payer: MEDICAID

## 2018-12-04 DIAGNOSIS — Z98.890 STATUS POST ELECTIVE ABORTION: ICD-10-CM

## 2018-12-04 PROCEDURE — 84702 CHORIONIC GONADOTROPIN TEST: CPT

## 2018-12-04 PROCEDURE — 36415 COLL VENOUS BLD VENIPUNCTURE: CPT

## 2018-12-04 NOTE — TELEPHONE ENCOUNTER
Pt informed of [facility] recs below and verbalized understanding. Order placed for hcg quant and pt instructed to complete either today or tomorrow and call the next day for results.     Pt informed message will be sent to our referral coordinator to assist with

## 2018-12-04 NOTE — TELEPHONE ENCOUNTER
US ordered. Please let patient know that she needs to make sure referral is completed before getting US done. We are looking for retained products due to her still bleeding and elevated quant. She should not start OCPs and needs to use condoms every time.

## 2018-12-05 NOTE — TELEPHONE ENCOUNTER
Cpt [id number] approved for 1 DOS 12/5/18-1/19/19 [id number]  Cpt 99196 approved for 1 DOS 12/5/18 - 1/19/19 [id number]  Patient was informed.

## 2018-12-11 ENCOUNTER — HOSPITAL ENCOUNTER (OUTPATIENT)
Dept: ULTRASOUND IMAGING | Facility: HOSPITAL | Age: 28
Discharge: HOME | End: 2018-12-11
Attending: OBSTETRICS & GYNECOLOGY
Payer: MEDICAID

## 2018-12-11 DIAGNOSIS — O03.4 RETAINED PRODUCTS OF CONCEPTION AFTER MISCARRIAGE: ICD-10-CM

## 2018-12-11 PROCEDURE — 76830 TRANSVAGINAL US NON-OB: CPT | Performed by: OBSTETRICS & GYNECOLOGY

## 2018-12-11 PROCEDURE — 76856 US EXAM PELVIC COMPLETE: CPT | Performed by: OBSTETRICS & GYNECOLOGY

## 2018-12-12 ENCOUNTER — TELEPHONE (OUTPATIENT)
Dept: OBGYN CLINIC | Facility: CLINIC | Age: 28
End: 2018-12-12

## 2018-12-12 NOTE — TELEPHONE ENCOUNTER
Message to [facility] as FYI that US results are available.

## 2018-12-12 NOTE — TELEPHONE ENCOUNTER
----- Message from Ray Mg MD sent at 12/5/2018  7:58 PM CST -----  Please let patient know that her HCG is going down. I would like and US still and will call her when I get the results.

## 2018-12-13 ENCOUNTER — TELEPHONE (OUTPATIENT)
Dept: OBGYN CLINIC | Facility: CLINIC | Age: 28
End: 2018-12-13

## 2018-12-13 DIAGNOSIS — O03.4 RETAINED PRODUCTS OF CONCEPTION AFTER MISCARRIAGE: Primary | ICD-10-CM

## 2018-12-13 NOTE — TELEPHONE ENCOUNTER
Called patient to review US report. Reviewed with patient US showing retained products. She continues to have spotting and bleeding. Reviewed risks of suction dilation and curettage. Reviewed risks of bleeding, infection, uterine perforation.  Reviewed pelv

## 2018-12-13 NOTE — TELEPHONE ENCOUNTER
/OB GYN SURGICAL SCHEDULING    Diagnosis: Retained Products of Conception     Operative Procedure:  Suction Dilation and Curettage     Side: generalized    Date: 12/19/18 at 12:30pm     Admission:  Day Surgery    Anesthesia:  General     Bowel Prep:  No

## 2018-12-14 NOTE — TELEPHONE ENCOUNTER
Patient scheduled 12/19/18 1pm D&C KCB. Pat orders. Instructions placed at  for pickup. Patient informed.     No PA required for cpt 67280 per [facility] rep REF [id number]

## 2018-12-15 NOTE — TELEPHONE ENCOUNTER
Noted. If we can call the OR Monday and Tuesday to see if they are able to move up the case to 1230 that would be great.

## 2018-12-17 RX ORDER — ACETAMINOPHEN 500 MG
1000 TABLET ORAL ONCE
Status: CANCELLED | OUTPATIENT
Start: 2018-12-17 | End: 2018-12-17

## 2018-12-17 RX ORDER — METOCLOPRAMIDE 10 MG/1
10 TABLET ORAL ONCE
Status: CANCELLED | OUTPATIENT
Start: 2018-12-17 | End: 2018-12-17

## 2018-12-17 RX ORDER — FAMOTIDINE 20 MG/1
20 TABLET ORAL ONCE
Status: CANCELLED | OUTPATIENT
Start: 2018-12-17 | End: 2018-12-17

## 2018-12-17 RX ORDER — SODIUM CHLORIDE, SODIUM LACTATE, POTASSIUM CHLORIDE, CALCIUM CHLORIDE 600; 310; 30; 20 MG/100ML; MG/100ML; MG/100ML; MG/100ML
INJECTION, SOLUTION INTRAVENOUS CONTINUOUS
Status: CANCELLED | OUTPATIENT
Start: 2018-12-17

## 2018-12-19 ENCOUNTER — TELEPHONE (OUTPATIENT)
Dept: OBGYN CLINIC | Facility: CLINIC | Age: 28
End: 2018-12-19

## 2018-12-19 ENCOUNTER — HOSPITAL ENCOUNTER (OUTPATIENT)
Facility: HOSPITAL | Age: 28
Setting detail: HOSPITAL OUTPATIENT SURGERY
Discharge: HOME | End: 2018-12-19
Attending: OBSTETRICS & GYNECOLOGY | Admitting: OBSTETRICS & GYNECOLOGY
Payer: MEDICAID

## 2018-12-19 VITALS — HEIGHT: 61 IN | BODY MASS INDEX: 33.04 KG/M2 | WEIGHT: 175 LBS

## 2018-12-19 DIAGNOSIS — O03.4 RETAINED PRODUCTS OF CONCEPTION AFTER MISCARRIAGE: Primary | ICD-10-CM

## 2018-12-19 DIAGNOSIS — O03.4 RETAINED PRODUCTS OF CONCEPTION AFTER MISCARRIAGE: ICD-10-CM

## 2018-12-19 RX ORDER — SODIUM CHLORIDE, SODIUM LACTATE, POTASSIUM CHLORIDE, CALCIUM CHLORIDE 600; 310; 30; 20 MG/100ML; MG/100ML; MG/100ML; MG/100ML
INJECTION, SOLUTION INTRAVENOUS CONTINUOUS
Status: CANCELLED | OUTPATIENT
Start: 2018-12-19

## 2018-12-19 NOTE — TELEPHONE ENCOUNTER
Pt canceled surgery today due to social issues. She will need surgery next week wit BRENDA or ALESSANDRO.      OB GYN SURGICAL SCHEDULING    Diagnosis: Retained products of conception     Operative Procedure: Suction Dilation and Curettage     Side: generalized    Da

## 2018-12-19 NOTE — H&P
[address] Patient Status:  [facility] Outpatient Surgery    1990 MRN [id number]   [place] [address] Attending Flo Phelan MD   Hosp Day # 0 MARIO Cortez DILATION & CURETTAGE SUCTION Yelitza Beck MD [facility] MAIN OR [facility]        Family History:   Family History   Problem Relation Age of Onset   • Hypertension Mother      Social History: Social History    Tobacco Use      Smoking status: Never Smoker      Smok bleeding, infection, damage to surrounding tissue (bowel, bladder, ureter) and uterine perforation.      Popeye Covert  12/19/2018  7:48 AM

## 2018-12-19 NOTE — TELEPHONE ENCOUNTER
Patient states she cannot stay because she has to  her daughter from school and wants to reschedule. Please advise on date/time patient should be rescheduled.

## 2018-12-19 NOTE — TELEPHONE ENCOUNTER
Talked to patient on the phone while she was in [address] [place] [address] Street. Pt without ride for her daughter and decided today was not a good day to have surgery. Reviewed with patient increased risk of infection if she waits.  She states she understands the increased risk but

## 2018-12-19 NOTE — TELEPHONE ENCOUNTER
Patient rescheduled to 12/26/18 7:30am D/c ALESSANDRO. Pat orders routed. Patient confirmed she has pre op instructions and was advised to follow them according to new procedure date/time.

## 2018-12-26 ENCOUNTER — TELEPHONE (OUTPATIENT)
Dept: OBGYN CLINIC | Facility: CLINIC | Age: 28
End: 2018-12-26

## 2018-12-26 ENCOUNTER — ANESTHESIA EVENT (OUTPATIENT)
Dept: SURGERY | Facility: HOSPITAL | Age: 28
End: 2018-12-26

## 2018-12-26 ENCOUNTER — ANESTHESIA (OUTPATIENT)
Dept: SURGERY | Facility: HOSPITAL | Age: 28
End: 2018-12-26

## 2018-12-26 DIAGNOSIS — O03.4 RETAINED PRODUCTS OF CONCEPTION AFTER MISCARRIAGE: Primary | ICD-10-CM

## 2018-12-26 NOTE — TELEPHONE ENCOUNTER
Per the patient's request she was rescheduled to 1/3/18 12:30pm D+c ALESSANDRO. Pat orders routed instructions routed previously, patient confirmed they were received. Patient informed of new procedure date/time.

## 2018-12-26 NOTE — TELEPHONE ENCOUNTER
Pt was scheduled for suction D&C for retained POC today. Pt overslept.   Pt wants to reschedule ( pt was previously scheduled last week but rescheduled due to )    Please reschedule ASAP

## 2019-01-02 ENCOUNTER — LAB ENCOUNTER (OUTPATIENT)
Dept: LAB | Facility: HOSPITAL | Age: 29
End: 2019-01-02
Attending: OBSTETRICS & GYNECOLOGY
Payer: MEDICAID

## 2019-01-02 DIAGNOSIS — Z01.818 PREOP TESTING: ICD-10-CM

## 2019-01-02 LAB
ANTIBODY SCREEN: NEGATIVE
BASOPHILS # BLD: 0 K/UL (ref 0–0.2)
BASOPHILS NFR BLD: 0 %
EOSINOPHIL # BLD: 0.1 K/UL (ref 0–0.7)
EOSINOPHIL NFR BLD: 1 %
ERYTHROCYTE [DISTWIDTH] IN BLOOD BY AUTOMATED COUNT: 13.9 % (ref 11–15)
HCT VFR BLD AUTO: 35 % (ref 35–48)
HGB BLD-MCNC: 11.7 G/DL (ref 12–16)
LYMPHOCYTES # BLD: 2.5 K/UL (ref 1–4)
LYMPHOCYTES NFR BLD: 40 %
MCH RBC QN AUTO: 28.6 PG (ref 27–32)
MCHC RBC AUTO-ENTMCNC: 33.4 G/DL (ref 32–37)
MCV RBC AUTO: 85.8 FL (ref 80–100)
MONOCYTES # BLD: 0.4 K/UL (ref 0–1)
MONOCYTES NFR BLD: 7 %
NEUTROPHILS # BLD AUTO: 3.2 K/UL (ref 1.8–7.7)
NEUTROPHILS NFR BLD: 52 %
PLATELET # BLD AUTO: 401 K/UL (ref 140–400)
PMV BLD AUTO: 7.5 FL (ref 7.4–10.3)
RBC # BLD AUTO: 4.08 M/UL (ref 3.7–5.4)
RH BLOOD TYPE: POSITIVE
WBC # BLD AUTO: 6.2 K/UL (ref 4–11)

## 2019-01-02 PROCEDURE — 85025 COMPLETE CBC W/AUTO DIFF WBC: CPT

## 2019-01-02 PROCEDURE — 36415 COLL VENOUS BLD VENIPUNCTURE: CPT

## 2019-01-02 PROCEDURE — 86900 BLOOD TYPING SEROLOGIC ABO: CPT

## 2019-01-02 PROCEDURE — 86901 BLOOD TYPING SEROLOGIC RH(D): CPT

## 2019-01-02 PROCEDURE — 86850 RBC ANTIBODY SCREEN: CPT

## 2019-01-02 NOTE — PAT NURSING NOTE
Spoke with pt to ff up on the lab tests needed preop . Will be going to [facility] today .

## 2019-01-03 ENCOUNTER — ANESTHESIA EVENT (OUTPATIENT)
Dept: SURGERY | Facility: HOSPITAL | Age: 29
End: 2019-01-03
Payer: MEDICAID

## 2019-01-03 ENCOUNTER — HOSPITAL ENCOUNTER (OUTPATIENT)
Facility: HOSPITAL | Age: 29
Setting detail: HOSPITAL OUTPATIENT SURGERY
Discharge: HOME | End: 2019-01-03
Attending: OBSTETRICS & GYNECOLOGY | Admitting: OBSTETRICS & GYNECOLOGY
Payer: MEDICAID

## 2019-01-03 ENCOUNTER — ANESTHESIA (OUTPATIENT)
Dept: SURGERY | Facility: HOSPITAL | Age: 29
End: 2019-01-03
Payer: MEDICAID

## 2019-01-03 VITALS
HEIGHT: 61 IN | WEIGHT: 175.5 LBS | SYSTOLIC BLOOD PRESSURE: 146 MMHG | DIASTOLIC BLOOD PRESSURE: 92 MMHG | HEART RATE: 80 BPM | RESPIRATION RATE: 16 BRPM | TEMPERATURE: 98 F | BODY MASS INDEX: 33.14 KG/M2 | OXYGEN SATURATION: 100 %

## 2019-01-03 DIAGNOSIS — Z01.818 PREOP TESTING: Primary | ICD-10-CM

## 2019-01-03 DIAGNOSIS — O03.4 RETAINED PRODUCTS OF CONCEPTION AFTER MISCARRIAGE: ICD-10-CM

## 2019-01-03 PROCEDURE — 10D17Z9 MANUAL EXTRACTION OF PRODUCTS OF CONCEPTION, RETAINED, VIA NATURAL OR ARTIFICIAL OPENING: ICD-10-PCS | Performed by: OBSTETRICS & GYNECOLOGY

## 2019-01-03 PROCEDURE — 59812 TREATMENT OF MISCARRIAGE: CPT | Performed by: OBSTETRICS & GYNECOLOGY

## 2019-01-03 RX ORDER — HYDROCODONE BITARTRATE AND ACETAMINOPHEN 5; 325 MG/1; MG/1
2 TABLET ORAL AS NEEDED
Status: DISCONTINUED | OUTPATIENT
Start: 2019-01-03 | End: 2019-01-03

## 2019-01-03 RX ORDER — HYDROCODONE BITARTRATE AND ACETAMINOPHEN 5; 325 MG/1; MG/1
1 TABLET ORAL EVERY 6 HOURS PRN
Status: DISCONTINUED | OUTPATIENT
Start: 2019-01-03 | End: 2019-01-03

## 2019-01-03 RX ORDER — IBUPROFEN 600 MG/1
600 TABLET ORAL EVERY 6 HOURS PRN
Qty: 30 TABLET | Refills: 0 | Status: SHIPPED | OUTPATIENT
Start: 2019-01-03 | End: 2019-02-20

## 2019-01-03 RX ORDER — DOXYCYCLINE 100 MG/10ML
INJECTION, POWDER, LYOPHILIZED, FOR SOLUTION INTRAVENOUS AS NEEDED
Status: DISCONTINUED | OUTPATIENT
Start: 2019-01-03 | End: 2019-01-03 | Stop reason: SURG

## 2019-01-03 RX ORDER — ACETAMINOPHEN 500 MG
1000 TABLET ORAL ONCE
Status: COMPLETED | OUTPATIENT
Start: 2019-01-03 | End: 2019-01-03

## 2019-01-03 RX ORDER — HYDROCODONE BITARTRATE AND ACETAMINOPHEN 5; 325 MG/1; MG/1
1 TABLET ORAL AS NEEDED
Status: DISCONTINUED | OUTPATIENT
Start: 2019-01-03 | End: 2019-01-03

## 2019-01-03 RX ORDER — ONDANSETRON 2 MG/ML
4 INJECTION INTRAMUSCULAR; INTRAVENOUS ONCE AS NEEDED
Status: DISCONTINUED | OUTPATIENT
Start: 2019-01-03 | End: 2019-01-03

## 2019-01-03 RX ORDER — ACETAMINOPHEN 325 MG/1
650 TABLET ORAL EVERY 6 HOURS PRN
Status: DISCONTINUED | OUTPATIENT
Start: 2019-01-03 | End: 2019-01-03

## 2019-01-03 RX ORDER — SODIUM CHLORIDE, SODIUM LACTATE, POTASSIUM CHLORIDE, CALCIUM CHLORIDE 600; 310; 30; 20 MG/100ML; MG/100ML; MG/100ML; MG/100ML
INJECTION, SOLUTION INTRAVENOUS CONTINUOUS
Status: DISCONTINUED | OUTPATIENT
Start: 2019-01-03 | End: 2019-01-03

## 2019-01-03 RX ORDER — MORPHINE SULFATE 10 MG/ML
6 INJECTION, SOLUTION INTRAMUSCULAR; INTRAVENOUS EVERY 10 MIN PRN
Status: DISCONTINUED | OUTPATIENT
Start: 2019-01-03 | End: 2019-01-03

## 2019-01-03 RX ORDER — NALOXONE HYDROCHLORIDE 0.4 MG/ML
80 INJECTION, SOLUTION INTRAMUSCULAR; INTRAVENOUS; SUBCUTANEOUS AS NEEDED
Status: DISCONTINUED | OUTPATIENT
Start: 2019-01-03 | End: 2019-01-03

## 2019-01-03 RX ORDER — METOCLOPRAMIDE 10 MG/1
10 TABLET ORAL ONCE
Status: COMPLETED | OUTPATIENT
Start: 2019-01-03 | End: 2019-01-03

## 2019-01-03 RX ORDER — MIDAZOLAM HYDROCHLORIDE 1 MG/ML
INJECTION INTRAMUSCULAR; INTRAVENOUS AS NEEDED
Status: DISCONTINUED | OUTPATIENT
Start: 2019-01-03 | End: 2019-01-03 | Stop reason: SURG

## 2019-01-03 RX ORDER — MORPHINE SULFATE 4 MG/ML
4 INJECTION, SOLUTION INTRAMUSCULAR; INTRAVENOUS EVERY 10 MIN PRN
Status: DISCONTINUED | OUTPATIENT
Start: 2019-01-03 | End: 2019-01-03

## 2019-01-03 RX ORDER — ONDANSETRON 4 MG/1
4 TABLET, FILM COATED ORAL EVERY 8 HOURS PRN
Status: DISCONTINUED | OUTPATIENT
Start: 2019-01-03 | End: 2019-01-03

## 2019-01-03 RX ORDER — HALOPERIDOL 5 MG/ML
0.25 INJECTION INTRAMUSCULAR ONCE AS NEEDED
Status: DISCONTINUED | OUTPATIENT
Start: 2019-01-03 | End: 2019-01-03

## 2019-01-03 RX ORDER — FAMOTIDINE 20 MG/1
20 TABLET ORAL ONCE
Status: COMPLETED | OUTPATIENT
Start: 2019-01-03 | End: 2019-01-03

## 2019-01-03 RX ORDER — IBUPROFEN 600 MG/1
600 TABLET ORAL EVERY 6 HOURS PRN
Status: DISCONTINUED | OUTPATIENT
Start: 2019-01-03 | End: 2019-01-03

## 2019-01-03 RX ORDER — ONDANSETRON 2 MG/ML
INJECTION INTRAMUSCULAR; INTRAVENOUS AS NEEDED
Status: DISCONTINUED | OUTPATIENT
Start: 2019-01-03 | End: 2019-01-03 | Stop reason: SURG

## 2019-01-03 RX ORDER — HYDROCODONE BITARTRATE AND ACETAMINOPHEN 5; 325 MG/1; MG/1
2 TABLET ORAL EVERY 6 HOURS PRN
Status: DISCONTINUED | OUTPATIENT
Start: 2019-01-03 | End: 2019-01-03

## 2019-01-03 RX ORDER — MORPHINE SULFATE 4 MG/ML
2 INJECTION, SOLUTION INTRAMUSCULAR; INTRAVENOUS EVERY 10 MIN PRN
Status: DISCONTINUED | OUTPATIENT
Start: 2019-01-03 | End: 2019-01-03

## 2019-01-03 RX ORDER — ONDANSETRON 2 MG/ML
4 INJECTION INTRAMUSCULAR; INTRAVENOUS EVERY 8 HOURS PRN
Status: DISCONTINUED | OUTPATIENT
Start: 2019-01-03 | End: 2019-01-03

## 2019-01-03 RX ADMIN — ONDANSETRON 4 MG: 2 INJECTION INTRAMUSCULAR; INTRAVENOUS at 14:25:00

## 2019-01-03 RX ADMIN — DOXYCYCLINE 100 MG: 100 INJECTION, POWDER, LYOPHILIZED, FOR SOLUTION INTRAVENOUS at 14:18:00

## 2019-01-03 RX ADMIN — MIDAZOLAM HYDROCHLORIDE 4 MG: 1 INJECTION INTRAMUSCULAR; INTRAVENOUS at 14:18:00

## 2019-01-03 NOTE — OR PREOP
Patient stated she had an elective  in October and has retained products of conception. She has had bleeding and spotting on and off since.   Procedure today is suction d&c which normally does not require a pregnancy test. Patient stated she had a pe

## 2019-01-03 NOTE — DISCHARGE SUMMARY
[facility]    Outpatient Surgery Discharge Summary    Cindy Albert Patient Status:  Hospital Outpatient Surgery    1990 MRN [id number]   Location [address] UNIT Attending Clarisse Rice MD   [facility]

## 2019-01-03 NOTE — OPERATIVE REPORT
[address] Detailed Operative Note    Franchescabailey Vaughn Patient Status:  Hospital Outpatient Surgery    1990 MRN [id number]   Location [address] UN[address] Attending Titus Mills MD   [facility] Day # 0 PC

## 2019-01-03 NOTE — H&P
Pre-Operative History and Physical        HPI:  Olinda Pappas is a 29year old [id number] Patient's last menstrual period was 2018. who presents for D&C. Seen Dr Parish Saavedra on 18 and was told that she had elective  in 10/6/18.   [facility] on file      Years of education: Not on file      Highest education level: Not on file    Social Needs      Financial resource strain: Not on file      Food insecurity - worry: Not on file      Food insecurity - inability: Not on file      Transportation n

## 2019-01-03 NOTE — ANESTHESIA POSTPROCEDURE EVALUATION
Patient: Jaquan Hastings    Procedure Summary     Date:  01/03/19 Room / Location:  [address] [place] [address] MAIN OR 06 / [address] [place] [address] OR    Anesthesia Start:  1678 Anesthesia Stop:      Procedure:  DILATION & CURETTAGE SUCTION (N/A Vagina ) Diagnosis:       Retained products

## 2019-01-03 NOTE — ANESTHESIA PROCEDURE NOTES
ANESTHESIA INTUBATION  Date/Time: 1/3/2019 2:25 PM  Urgency: elective    Airway not difficult    General Information and Staff    Patient location during procedure: OR  Anesthesiologist: Robert Robins MD  Performed: anesthesiologist     Indications an

## 2019-01-03 NOTE — ANESTHESIA PREPROCEDURE EVALUATION
Anesthesia PreOp Note    HPI:     Freddy Page is a 29year old female who presents for preoperative consultation requested by: Oscar Valdez MD    Date of Surgery: 1/3/2019    Procedure(s):  DILATION & CURETTAGE SUCTION  Indication: Retained product Problem Relation Age of Onset   • Hypertension Mother      Social History    Socioeconomic History      Marital status: Single      Spouse name: Not on file      Number of children: Not on file      Years of education: Not on file      Highest education Mallampati: I  TM distance: >3 FB  Neck ROM: full  Dental - normal exam     Pulmonary - negative ROS and normal exam   Cardiovascular - negative ROS and normal exam  Exercise tolerance: good    Neuro/Psych - negative ROS     GI/Hepatic/Renal - negative R

## 2019-01-14 ENCOUNTER — TELEPHONE (OUTPATIENT)
Dept: OBGYN CLINIC | Facility: CLINIC | Age: 29
End: 2019-01-14

## 2019-01-14 NOTE — TELEPHONE ENCOUNTER
PER PT REQUESTING AN POST-OP APPT / THE ONLY APPT   HAS IS 01/17/18 / PT STATE SHE CAN'T MAKE THAT APPT / PT NEED TO BE SEEN IN 2 WEEKS OF HER PROCEDURE  / PLS ADV

## 2019-02-20 ENCOUNTER — OFFICE VISIT (OUTPATIENT)
Dept: OBGYN CLINIC | Facility: CLINIC | Age: 29
End: 2019-02-20
Payer: MEDICAID

## 2019-02-20 VITALS
WEIGHT: 180 LBS | DIASTOLIC BLOOD PRESSURE: 79 MMHG | SYSTOLIC BLOOD PRESSURE: 114 MMHG | HEART RATE: 76 BPM | BODY MASS INDEX: 34 KG/M2

## 2019-02-20 DIAGNOSIS — Z98.890 POST-OPERATIVE STATE: ICD-10-CM

## 2019-02-20 DIAGNOSIS — N89.8 VAGINAL ODOR: Primary | ICD-10-CM

## 2019-02-20 PROCEDURE — 99213 OFFICE O/P EST LOW 20 MIN: CPT | Performed by: OBSTETRICS & GYNECOLOGY

## 2019-02-22 LAB
GENITAL VAGINOSIS SCREEN: POSITIVE
TRICHOMONAS SCREEN: NEGATIVE

## 2019-02-25 ENCOUNTER — TELEPHONE (OUTPATIENT)
Dept: OBGYN CLINIC | Facility: CLINIC | Age: 29
End: 2019-02-25

## 2019-02-25 RX ORDER — METRONIDAZOLE 500 MG/1
TABLET ORAL
Qty: 14 TABLET | Refills: 0 | Status: SHIPPED | OUTPATIENT
Start: 2019-02-25 | End: 2019-06-03 | Stop reason: ALTCHOICE

## 2019-02-25 RX ORDER — FLUCONAZOLE 150 MG/1
150 TABLET ORAL ONCE
Qty: 1 TABLET | Refills: 0 | Status: SHIPPED | OUTPATIENT
Start: 2019-02-25 | End: 2019-02-25

## 2019-02-25 NOTE — TELEPHONE ENCOUNTER
Informed pt that BV screen  positive for BV and yeast.   Flagyl 500 mg bid x 7 days. Then Diflucan 150 mg. Pt stated understanding. Rxs sent tot he pharmacy.

## 2019-02-25 NOTE — TELEPHONE ENCOUNTER
----- Message from Ebony Simental MD sent at 2/23/2019  7:03 PM CST -----  BV screen positive for BV and yeast.   Flagyl 500 mg bid x 7 days.   Then Diflucan 150 mg x 1

## 2019-06-03 ENCOUNTER — HOSPITAL ENCOUNTER (OUTPATIENT)
Age: 29
Discharge: HOME | End: 2019-06-03
Attending: EMERGENCY MEDICINE
Payer: MEDICAID

## 2019-06-03 VITALS
OXYGEN SATURATION: 98 % | SYSTOLIC BLOOD PRESSURE: 138 MMHG | DIASTOLIC BLOOD PRESSURE: 89 MMHG | HEART RATE: 72 BPM | RESPIRATION RATE: 16 BRPM | TEMPERATURE: 98 F

## 2019-06-03 DIAGNOSIS — N89.8 VAGINAL DISCHARGE: Primary | ICD-10-CM

## 2019-06-03 DIAGNOSIS — Z32.00 PREGNANCY EXAMINATION OR TEST, PREGNANCY UNCONFIRMED: ICD-10-CM

## 2019-06-03 PROCEDURE — 81025 URINE PREGNANCY TEST: CPT | Performed by: EMERGENCY MEDICINE

## 2019-06-03 PROCEDURE — 96372 THER/PROPH/DIAG INJ SC/IM: CPT

## 2019-06-03 PROCEDURE — 87591 N.GONORRHOEAE DNA AMP PROB: CPT | Performed by: EMERGENCY MEDICINE

## 2019-06-03 PROCEDURE — 87491 CHLMYD TRACH DNA AMP PROBE: CPT | Performed by: EMERGENCY MEDICINE

## 2019-06-03 PROCEDURE — 99214 OFFICE O/P EST MOD 30 MIN: CPT

## 2019-06-03 PROCEDURE — 81002 URINALYSIS NONAUTO W/O SCOPE: CPT | Performed by: EMERGENCY MEDICINE

## 2019-06-03 PROCEDURE — 87480 CANDIDA DNA DIR PROBE: CPT | Performed by: EMERGENCY MEDICINE

## 2019-06-03 PROCEDURE — 87660 TRICHOMONAS VAGIN DIR PROBE: CPT | Performed by: EMERGENCY MEDICINE

## 2019-06-03 PROCEDURE — 87510 GARDNER VAG DNA DIR PROBE: CPT | Performed by: EMERGENCY MEDICINE

## 2019-06-03 RX ORDER — AZITHROMYCIN 250 MG/1
1000 TABLET, FILM COATED ORAL ONCE
Status: COMPLETED | OUTPATIENT
Start: 2019-06-03 | End: 2019-06-03

## 2019-06-03 RX ORDER — FLUCONAZOLE 150 MG/1
150 TABLET ORAL ONCE
Qty: 1 TABLET | Refills: 0 | Status: SHIPPED | OUTPATIENT
Start: 2019-06-03 | End: 2019-06-03

## 2019-06-03 RX ORDER — METRONIDAZOLE 7.5 MG/G
1 GEL VAGINAL NIGHTLY
Qty: 70 G | Refills: 0 | Status: SHIPPED | OUTPATIENT
Start: 2019-06-03 | End: 2019-06-08

## 2019-06-03 NOTE — ED INITIAL ASSESSMENT (HPI)
Pt c/o vaginal discharge for the last 3 days that she states is whitish in color. Pt states that she had a history of bacterial vaginosis a few months ago. Pt denies any urinary symptoms.

## 2019-06-03 NOTE — ED PROVIDER NOTES
Patient Seen in: [address] [facility]    History   Patient presents with:  Eval-G (genital)    Stated Complaint: eval g    HPI    Patient complains of vaginal discharge that started a few days ago.   It is reminiscent of bacterial inf quadrants  There is normal female external genitalia. Vaginal vault without extraordinary erythema. No cervical motion tenderness. Heavy white vaginal discharge noted.        ED Course     Labs Reviewed   POCT URINALYSIS DIPSTICK - Normal   POCT [facility]

## 2019-08-08 ENCOUNTER — TELEPHONE (OUTPATIENT)
Dept: OBGYN CLINIC | Facility: CLINIC | Age: 29
End: 2019-08-08

## 2019-08-08 NOTE — TELEPHONE ENCOUNTER
Pt reports vaginal itching and thick, white discharge since yesterday. Pt denies odor. Advised pt to use Monistat 7 day tx otc. Pt verbalized understanding.

## 2019-10-23 ENCOUNTER — HOSPITAL ENCOUNTER (OUTPATIENT)
Age: 29
Discharge: HOME | End: 2019-10-23
Attending: FAMILY MEDICINE
Payer: MEDICAID

## 2019-10-23 VITALS
SYSTOLIC BLOOD PRESSURE: 147 MMHG | TEMPERATURE: 98 F | BODY MASS INDEX: 33.99 KG/M2 | WEIGHT: 180 LBS | HEART RATE: 74 BPM | RESPIRATION RATE: 16 BRPM | DIASTOLIC BLOOD PRESSURE: 93 MMHG | HEIGHT: 61 IN | OXYGEN SATURATION: 100 %

## 2019-10-23 DIAGNOSIS — N89.8 VAGINAL DISCHARGE: Primary | ICD-10-CM

## 2019-10-23 DIAGNOSIS — N76.0 ACUTE VAGINITIS: ICD-10-CM

## 2019-10-23 PROCEDURE — 81002 URINALYSIS NONAUTO W/O SCOPE: CPT | Performed by: FAMILY MEDICINE

## 2019-10-23 PROCEDURE — 81025 URINE PREGNANCY TEST: CPT | Performed by: FAMILY MEDICINE

## 2019-10-23 PROCEDURE — 99213 OFFICE O/P EST LOW 20 MIN: CPT

## 2019-10-23 PROCEDURE — 99214 OFFICE O/P EST MOD 30 MIN: CPT

## 2019-10-23 RX ORDER — METRONIDAZOLE 500 MG/1
500 TABLET ORAL 2 TIMES DAILY
Qty: 14 TABLET | Refills: 0 | Status: SHIPPED | OUTPATIENT
Start: 2019-10-23 | End: 2019-10-30

## 2019-10-23 NOTE — ED INITIAL ASSESSMENT (HPI)
Pt c/o \" I feel like I might have a yeast or bacterial infection\". S/S x 1 week, white vaginal discharge, some pressure when urinating. Pt denies any concern for STD's and denies having any unprotected sex recently. LMP10/17/19.

## 2019-10-23 NOTE — ED PROVIDER NOTES
Patient Seen in: [address] [facility]      History   Patient presents with:  Eval-G (genital)    Stated Complaint: eval g    HPI     49-year-old female presents to the immediate care today with chief complaints of vaginal discharge, Temp 98.3 °F (36.8 °C)   Temp src Temporal   SpO2 100 %   O2 Device None (Room air)       Current:BP (!) 147/93   Pulse 74   Temp 98.3 °F (36.8 °C) (Temporal)   Resp 16   Ht 154.9 cm (5' 1\")   Wt 81.6 kg   LMP 10/17/2019 (Exact Date)   SpO2 100%   BMI 3 05404  [phone]    In 1 week  For re-check        Medications Prescribed:  Current Discharge Medication List    START taking these medications    metRONIDAZOLE 500 MG Oral Tab  Take 1 tablet (500 mg total) by mouth 2 (two) times daily for 7 days.   Qty:

## 2019-12-09 ENCOUNTER — OFFICE VISIT (OUTPATIENT)
Dept: OBGYN CLINIC | Facility: CLINIC | Age: 29
End: 2019-12-09
Payer: MEDICAID

## 2019-12-09 VITALS
HEART RATE: 80 BPM | BODY MASS INDEX: 35 KG/M2 | WEIGHT: 187.63 LBS | SYSTOLIC BLOOD PRESSURE: 113 MMHG | DIASTOLIC BLOOD PRESSURE: 78 MMHG

## 2019-12-09 DIAGNOSIS — N89.8 VAGINAL DISCHARGE: Primary | ICD-10-CM

## 2019-12-09 PROCEDURE — 99213 OFFICE O/P EST LOW 20 MIN: CPT | Performed by: OBSTETRICS & GYNECOLOGY

## 2019-12-09 NOTE — H&P
HPI:  The patient is a 33 yo F c/o 1 wk of foul smelling dc. No itch. Changed soaps recently. No douching or recent antibiotics. Wants vaginal STD screen.      Reviewed medical and surgical history below       OBSTETRICS HISTORY:  OB History     T organization: Not on file        Attends meetings of clubs or organizations: Not on file        Relationship status: Not on file      Intimate partner violence:        Fear of current or ex partner: Not on file        Emotionally abused: Not on file

## 2019-12-10 ENCOUNTER — TELEPHONE (OUTPATIENT)
Dept: OBGYN CLINIC | Facility: CLINIC | Age: 29
End: 2019-12-10

## 2019-12-10 RX ORDER — METRONIDAZOLE 7.5 MG/G
1 GEL VAGINAL NIGHTLY
Qty: 1 TUBE | Refills: 0 | Status: SHIPPED | OUTPATIENT
Start: 2019-12-10 | End: 2019-12-15

## 2019-12-10 NOTE — TELEPHONE ENCOUNTER
----- Message from Levlr, DO sent at 12/9/2019 10:42 PM CST -----  Needs flagyl or metrogel for BV.  Yeast pending

## 2019-12-10 NOTE — TELEPHONE ENCOUNTER
Pt informed of results and recs and verbalized understanding. Pt chose Metrogel. Pharmacy updated per pt request and rx sent.

## 2020-03-04 ENCOUNTER — HOSPITAL ENCOUNTER (OUTPATIENT)
Age: 30
Discharge: HOME | End: 2020-03-04
Attending: FAMILY MEDICINE
Payer: MEDICAID

## 2020-03-04 VITALS
HEART RATE: 70 BPM | TEMPERATURE: 99 F | SYSTOLIC BLOOD PRESSURE: 126 MMHG | DIASTOLIC BLOOD PRESSURE: 73 MMHG | RESPIRATION RATE: 18 BRPM | OXYGEN SATURATION: 100 %

## 2020-03-04 DIAGNOSIS — N76.0 ACUTE VAGINITIS: Primary | ICD-10-CM

## 2020-03-04 LAB
BILIRUB UR QL STRIP: NEGATIVE
CLARITY UR: CLEAR
COLOR UR: YELLOW
GLUCOSE UR STRIP-MCNC: NEGATIVE MG/DL
HGB UR QL STRIP: NEGATIVE
KETONES UR STRIP-MCNC: NEGATIVE MG/DL
LEUKOCYTE ESTERASE UR QL STRIP: NEGATIVE
NITRITE UR QL STRIP: NEGATIVE
PH UR STRIP: 6.5 [PH]
PROT UR STRIP-MCNC: NEGATIVE MG/DL
SP GR UR STRIP: 1.02
UROBILINOGEN UR STRIP-ACNC: <2 MG/DL

## 2020-03-04 PROCEDURE — 99214 OFFICE O/P EST MOD 30 MIN: CPT

## 2020-03-04 PROCEDURE — 87808 TRICHOMONAS ASSAY W/OPTIC: CPT | Performed by: FAMILY MEDICINE

## 2020-03-04 PROCEDURE — 87106 FUNGI IDENTIFICATION YEAST: CPT | Performed by: FAMILY MEDICINE

## 2020-03-04 PROCEDURE — 87205 SMEAR GRAM STAIN: CPT | Performed by: FAMILY MEDICINE

## 2020-03-04 PROCEDURE — 87491 CHLMYD TRACH DNA AMP PROBE: CPT | Performed by: FAMILY MEDICINE

## 2020-03-04 PROCEDURE — 87591 N.GONORRHOEAE DNA AMP PROB: CPT | Performed by: FAMILY MEDICINE

## 2020-03-04 PROCEDURE — 81002 URINALYSIS NONAUTO W/O SCOPE: CPT

## 2020-03-04 RX ORDER — FLUCONAZOLE 150 MG/1
150 TABLET ORAL
Qty: 2 TABLET | Refills: 0 | Status: SHIPPED | OUTPATIENT
Start: 2020-03-04 | End: 2020-06-04 | Stop reason: ALTCHOICE

## 2020-03-04 NOTE — ED INITIAL ASSESSMENT (HPI)
Pt here with complaints of vaginal dishcharge and itching that started 2 days ago, pt denies any abd pain or fevers

## 2020-03-04 NOTE — ED PROVIDER NOTES
Patient Seen in: [address] [facility] [address]      History   Patient presents with:  Sierra    Stated Complaint: VAGINAL ISSUES    HPI    34yo F presents to IC with 2 days of vaginal itching and discharge.   Feels like previous yeast infec Oropharynx is clear. Eyes:      Pupils: Pupils are equal, round, and reactive to light. Cardiovascular:      Rate and Rhythm: Normal rate and regular rhythm.    Pulmonary:      Effort: Pulmonary effort is normal.      Breath sounds: Normal breath sounds

## 2020-03-05 LAB
C TRACH DNA SPEC QL NAA+PROBE: NEGATIVE
N GONORRHOEA DNA SPEC QL NAA+PROBE: NEGATIVE

## 2020-03-06 LAB
GENITAL VAGINOSIS SCREEN: POSITIVE
TRICHOMONAS SCREEN: NEGATIVE

## 2020-06-04 ENCOUNTER — HOSPITAL ENCOUNTER (OUTPATIENT)
Dept: GENERAL RADIOLOGY | Facility: HOSPITAL | Age: 30
Discharge: HOME | End: 2020-06-04
Attending: INTERNAL MEDICINE
Payer: MEDICAID

## 2020-06-04 ENCOUNTER — TELEPHONE (OUTPATIENT)
Dept: INTERNAL MEDICINE CLINIC | Facility: CLINIC | Age: 30
End: 2020-06-04

## 2020-06-04 ENCOUNTER — APPOINTMENT (OUTPATIENT)
Dept: LAB | Facility: HOSPITAL | Age: 30
End: 2020-06-04
Attending: INTERNAL MEDICINE
Payer: MEDICAID

## 2020-06-04 ENCOUNTER — OFFICE VISIT (OUTPATIENT)
Dept: INTERNAL MEDICINE CLINIC | Facility: CLINIC | Age: 30
End: 2020-06-04
Payer: MEDICAID

## 2020-06-04 VITALS
HEIGHT: 62.75 IN | TEMPERATURE: 98 F | SYSTOLIC BLOOD PRESSURE: 123 MMHG | BODY MASS INDEX: 32.83 KG/M2 | HEART RATE: 83 BPM | DIASTOLIC BLOOD PRESSURE: 85 MMHG | WEIGHT: 183 LBS

## 2020-06-04 DIAGNOSIS — Z01.818 PREOP EXAMINATION: ICD-10-CM

## 2020-06-04 DIAGNOSIS — Z01.818 PREOP EXAMINATION: Primary | ICD-10-CM

## 2020-06-04 PROBLEM — R50.9 FEVER, UNSPECIFIED FEVER CAUSE: Status: RESOLVED | Noted: 2017-03-25 | Resolved: 2020-06-04

## 2020-06-04 PROBLEM — R50.9 FEVER: Status: RESOLVED | Noted: 2017-03-25 | Resolved: 2020-06-04

## 2020-06-04 PROCEDURE — 71046 X-RAY EXAM CHEST 2 VIEWS: CPT | Performed by: INTERNAL MEDICINE

## 2020-06-04 PROCEDURE — 99244 OFF/OP CNSLTJ NEW/EST MOD 40: CPT | Performed by: INTERNAL MEDICINE

## 2020-06-04 PROCEDURE — 93005 ELECTROCARDIOGRAM TRACING: CPT

## 2020-06-04 PROCEDURE — 93010 ELECTROCARDIOGRAM REPORT: CPT | Performed by: INTERNAL MEDICINE

## 2020-06-04 NOTE — PROGRESS NOTES
Lukasz Berman is a 34year old female.   Patient presents with:  Pre-Op Exam: pre-op clearance Dr Idalia Lux)  06/18/20 Lipo 360 and BBL       HPI:   Pt coming for pre op   C/c pre op clearance   Requested by  Dr Tonya Abdullahi Sitting, Cuff Size: adult)   Pulse 83   Temp 98.2 °F (36.8 °C) (Oral)   Ht 5' 2.75\" (1.594 m)   Wt 183 lb (83 kg)   LMP 05/06/2020 (Exact Date)   BMI 32.68 kg/m²   GENERAL: well developed, well nourished,in no apparent distress  SKIN: no rashes,no suspici

## 2020-06-04 NOTE — TELEPHONE ENCOUNTER
Patient calling (verified name and ), states that she did the pre op clearance appointment earlier today, did the Xray and the EKG, wants to make sure if Dr Devonte Justice receives the results and was able to fax it to the surgeon, informed patient that Xray s

## 2020-06-05 ENCOUNTER — MED REC SCAN ONLY (OUTPATIENT)
Dept: INTERNAL MEDICINE CLINIC | Facility: CLINIC | Age: 30
End: 2020-06-05

## 2020-06-05 NOTE — TELEPHONE ENCOUNTER
labs, EKG and chest x-ray reviewed  Patient is cleared for surgery. She has good exercise tolerance. She is at low risk for any perioperative cardiac events.   She will need routine perioperative care including incentive spirometry, DVT prophylaxis as nee

## 2020-07-24 ENCOUNTER — NURSE TRIAGE (OUTPATIENT)
Dept: INTERNAL MEDICINE CLINIC | Facility: CLINIC | Age: 30
End: 2020-07-24

## 2020-07-24 NOTE — TELEPHONE ENCOUNTER
Patient agreed to appt tomorrow, scheduled with CHANTELLE LEIVA 9:20am, pt denied fever, respiratory symptoms, no exposure to Covid. Advised call back or ED if symptoms worse, pt agreed. Pt confirms no longer under care of Dr Jamie De Anda.
Please reply to pool: EM RN TRIAGE  Action Requested: Summary for Provider     []  Critical Lab, Recommendations Needed  [x] Need Additional Advice  []   FYI    []   Need Orders  [] Need Medications Sent to Pharmacy  []  Other     SUMMARY: Patient contacts
Pt seen once for preop in June --w/u neg   Agree with advice given and needing evaluation  I know that the physician assistant does have clinic tomorrow if she wants to be seen  Please help her with an appointment  pcp ?dr Galarza Ply
none

## 2020-07-25 NOTE — PROGRESS NOTES
HPI:    Patient ID: Wade Reece is a 27year old female. HPI   New patient to me presents for sx of a panic attack. Noted last night she had one and could not sleep.  Has been having them since her after her most recent cosmetic procedure and wo is 33.4 kg/m². Physical Exam    Constitutional: She is oriented to person, place, and time and thin. She appears well-developed and well-nourished. HENT:   Head: Normocephalic and atraumatic.    Right Ear: Tympanic membrane normal.   Left Ear: Tympanic m

## 2020-07-28 ENCOUNTER — TELEPHONE (OUTPATIENT)
Dept: INTERNAL MEDICINE CLINIC | Facility: CLINIC | Age: 30
End: 2020-07-28

## 2020-07-28 ENCOUNTER — PATIENT MESSAGE (OUTPATIENT)
Dept: INTERNAL MEDICINE CLINIC | Facility: CLINIC | Age: 30
End: 2020-07-28

## 2020-07-28 NOTE — TELEPHONE ENCOUNTER
From: Rosa Marking  To: Aby Martin PA-C  Sent: 7/28/2020 9:42 AM CDT  Subject: Visit Follow-up Question    Hello I just spoke with a nurse about returning to work I don't think I'm physically ready since surgery to return and I also don't think I

## 2020-07-28 NOTE — TELEPHONE ENCOUNTER
Had office visit on 7/25/20 with Coty Cherry due to anxiety issue and panic attack, was started on alprazolam and states that it helps a lot, symptoms better than before,with ROSALIA form to be completed, advised to send it through [employer] or can drop it to ou

## 2020-07-28 NOTE — TELEPHONE ENCOUNTER
Please see Pee Aguayo message also    From: Dereje Garcia  To: Brandon Alexander PA-C  Sent: 7/28/2020  9:42 AM CDT  Subject: Visit Follow-up Question    Hello I just spoke with a nurse about returning to work I don't think I'm physically ready since surge

## 2020-07-29 NOTE — TELEPHONE ENCOUNTER
I did not see the patient before her surgery nor did anyone in [place] do this procedure so I cannot attest to if the pt is physically ok to go back to work.  She will need the therapist who she sees to give the note once she starts therapy regarding the m

## 2020-09-09 ENCOUNTER — HOSPITAL ENCOUNTER (OUTPATIENT)
Age: 30
Discharge: HOME | End: 2020-09-09
Payer: MEDICAID

## 2020-09-09 VITALS
DIASTOLIC BLOOD PRESSURE: 82 MMHG | OXYGEN SATURATION: 100 % | TEMPERATURE: 98 F | SYSTOLIC BLOOD PRESSURE: 144 MMHG | HEIGHT: 62 IN | HEART RATE: 97 BPM | RESPIRATION RATE: 14 BRPM | BODY MASS INDEX: 32.94 KG/M2 | WEIGHT: 179 LBS

## 2020-09-09 DIAGNOSIS — R30.0 DYSURIA: ICD-10-CM

## 2020-09-09 DIAGNOSIS — N89.8 VAGINAL DISCHARGE: ICD-10-CM

## 2020-09-09 DIAGNOSIS — N76.0 VULVOVAGINITIS: Primary | ICD-10-CM

## 2020-09-09 LAB
B-HCG UR QL: NEGATIVE
BILIRUB UR QL STRIP: NEGATIVE
CLARITY UR: CLEAR
COLOR UR: YELLOW
GLUCOSE UR STRIP-MCNC: NEGATIVE MG/DL
HGB UR QL STRIP: NEGATIVE
KETONES UR STRIP-MCNC: NEGATIVE MG/DL
LEUKOCYTE ESTERASE UR QL STRIP: NEGATIVE
NITRITE UR QL STRIP: NEGATIVE
PH UR STRIP: 7 [PH]
PROT UR STRIP-MCNC: NEGATIVE MG/DL
SP GR UR STRIP: 1.02
UROBILINOGEN UR STRIP-ACNC: <2 MG/DL

## 2020-09-09 PROCEDURE — 99203 OFFICE O/P NEW LOW 30 MIN: CPT | Performed by: NURSE PRACTITIONER

## 2020-09-09 PROCEDURE — 81025 URINE PREGNANCY TEST: CPT | Performed by: NURSE PRACTITIONER

## 2020-09-09 PROCEDURE — 81002 URINALYSIS NONAUTO W/O SCOPE: CPT | Performed by: NURSE PRACTITIONER

## 2020-09-09 RX ORDER — FLUCONAZOLE 150 MG/1
150 TABLET ORAL ONCE
Qty: 1 TABLET | Refills: 0 | Status: SHIPPED | OUTPATIENT
Start: 2020-09-09 | End: 2020-09-09

## 2020-09-09 NOTE — ED INITIAL ASSESSMENT (HPI)
Pt states that she has had some white vaginal discharge and itching for a couple days, denies any urinary complaints.

## 2020-09-09 NOTE — ED PROVIDER NOTES
Patient Seen in: [address]      History   Patient presents with:  Sierra    Stated Complaint: bladder problem    HPI    This is a 35-year-old female presenting with vaginal discharge and itching.   Patient states for a c 81.2 kg   LMP 08/26/2020   SpO2 100%   BMI 32.74 kg/m²         Physical Exam  Vitals signs and nursing note reviewed. Constitutional:       Appearance: Normal appearance. HENT:      Head: Normocephalic.       Right Ear: External ear normal.      Left Ea discharge home to follow-up outpatient with go to ER precautions. Patient is agreeable with this plan of care. UCG negative. Urine dip unremarkable. Discussed these results with the patient.   Discussed treatment with Diflucan and clotrimazole cream a

## 2020-09-10 LAB
C TRACH DNA SPEC QL NAA+PROBE: NEGATIVE
N GONORRHOEA DNA SPEC QL NAA+PROBE: NEGATIVE

## 2020-09-11 LAB
GENITAL VAGINOSIS SCREEN: NEGATIVE
TRICHOMONAS SCREEN: NEGATIVE

## 2020-10-29 ENCOUNTER — TELEPHONE (OUTPATIENT)
Dept: OBGYN CLINIC | Facility: CLINIC | Age: 30
End: 2020-10-29

## 2020-10-29 NOTE — TELEPHONE ENCOUNTER
LMTCB.
Pt has an appt scheduled today and would like to speak with the nurse.  please advise
Pt was scheduled for appt today for vag culture but started a period and is having a full flow. Advised we should reschedule. Usually bleeds 4-5 days so rescheduled for Mon, 11-2-20.
Rt call
97.1

## 2020-11-02 ENCOUNTER — OFFICE VISIT (OUTPATIENT)
Dept: OBGYN CLINIC | Facility: CLINIC | Age: 30
End: 2020-11-02
Payer: MEDICAID

## 2020-11-02 VITALS
SYSTOLIC BLOOD PRESSURE: 128 MMHG | BODY MASS INDEX: 35 KG/M2 | DIASTOLIC BLOOD PRESSURE: 85 MMHG | HEART RATE: 80 BPM | WEIGHT: 192 LBS

## 2020-11-02 DIAGNOSIS — N89.8 VAGINAL DISCHARGE: Primary | ICD-10-CM

## 2020-11-02 DIAGNOSIS — N89.8 VAGINAL ODOR: ICD-10-CM

## 2020-11-02 PROCEDURE — 3079F DIAST BP 80-89 MM HG: CPT | Performed by: CLINICAL NURSE SPECIALIST

## 2020-11-02 PROCEDURE — 99213 OFFICE O/P EST LOW 20 MIN: CPT | Performed by: CLINICAL NURSE SPECIALIST

## 2020-11-02 PROCEDURE — 3074F SYST BP LT 130 MM HG: CPT | Performed by: CLINICAL NURSE SPECIALIST

## 2020-11-02 NOTE — PROGRESS NOTES
Shital Oliveros is a 27year old female [id number] Patient's last menstrual period was 10/29/2020. Patient presents with:  Gyn Problem: vaginal  irritation  Here with c/o vaginal odor and discharge before her period started last week.  She is still spotti file        Forced sexual activity: Not on file    Other Topics      Concerns:        Not on file    Social History Narrative      Not on file      MEDICATIONS:    Current Outpatient Medications:   •  ALPRAZolam 0.5 MG Oral Tab, Take 1 tablet (0.5 mg total

## 2020-12-01 ENCOUNTER — OFFICE VISIT (OUTPATIENT)
Dept: OBGYN CLINIC | Facility: CLINIC | Age: 30
End: 2020-12-01
Payer: MEDICAID

## 2020-12-01 VITALS
SYSTOLIC BLOOD PRESSURE: 124 MMHG | HEART RATE: 76 BPM | DIASTOLIC BLOOD PRESSURE: 86 MMHG | WEIGHT: 190.19 LBS | BODY MASS INDEX: 35 KG/M2

## 2020-12-01 DIAGNOSIS — N89.8 VAGINAL DISCHARGE: Primary | ICD-10-CM

## 2020-12-01 PROCEDURE — 3079F DIAST BP 80-89 MM HG: CPT | Performed by: OBSTETRICS & GYNECOLOGY

## 2020-12-01 PROCEDURE — 3074F SYST BP LT 130 MM HG: CPT | Performed by: OBSTETRICS & GYNECOLOGY

## 2020-12-01 PROCEDURE — 99213 OFFICE O/P EST LOW 20 MIN: CPT | Performed by: OBSTETRICS & GYNECOLOGY

## 2020-12-01 RX ORDER — ESCITALOPRAM OXALATE 10 MG/1
TABLET ORAL
Qty: 30 TABLET | Refills: 0 | Status: SHIPPED | OUTPATIENT
Start: 2020-12-01

## 2020-12-02 NOTE — PROGRESS NOTES
Boyd Hazel is a 27year old female [id number] Patient's last menstrual period was 11/24/2020. Patient presents with:  Gyn Problem: POSS. YEAST INFECTION, DISCHARGE, AND IRRITATION    Last seen 2/20/19.    Having vaginal discharge, irritation that sta no lesions. Mild irritation  Urethral Meatus:  normal in size, location, without lesions and prolapse  Bladder:  No fullness, masses or tenderness  Vagina:  Normal appearance without lesions, no abnormal discharge.   Milky white discharge      Assessment &

## 2020-12-07 ENCOUNTER — TELEPHONE (OUTPATIENT)
Dept: OBGYN CLINIC | Facility: CLINIC | Age: 30
End: 2020-12-07

## 2020-12-07 RX ORDER — METRONIDAZOLE 500 MG/1
500 TABLET ORAL 2 TIMES DAILY
Qty: 14 TABLET | Refills: 0 | Status: SHIPPED | OUTPATIENT
Start: 2020-12-07 | End: 2020-12-14

## 2020-12-07 NOTE — TELEPHONE ENCOUNTER
----- Message from Gustavo Jenkins MD sent at 12/6/2020  8:45 PM CST -----  BV screen positive for BV.    Flagyl 500 mg bid x 7 days

## 2020-12-07 NOTE — TELEPHONE ENCOUNTER
Pt notified of results and recs. Pharmacy confirmed and RX sent. Advised no etoh while taking RX. Encouraged to call back with any questions or concerns.

## 2021-01-08 ENCOUNTER — LAB ENCOUNTER (OUTPATIENT)
Dept: LAB | Facility: HOSPITAL | Age: 31
End: 2021-01-08
Attending: OBSTETRICS & GYNECOLOGY
Payer: MEDICAID

## 2021-01-08 ENCOUNTER — TELEPHONE (OUTPATIENT)
Dept: PEDIATRICS CLINIC | Facility: CLINIC | Age: 31
End: 2021-01-08

## 2021-01-08 DIAGNOSIS — O20.9 BLEEDING IN EARLY PREGNANCY: ICD-10-CM

## 2021-01-08 DIAGNOSIS — O20.9 BLEEDING IN EARLY PREGNANCY: Primary | ICD-10-CM

## 2021-01-08 LAB — B-HCG SERPL-ACNC: 49 MIU/ML

## 2021-01-08 PROCEDURE — 84702 CHORIONIC GONADOTROPIN TEST: CPT

## 2021-01-08 PROCEDURE — 36415 COLL VENOUS BLD VENIPUNCTURE: CPT

## 2021-01-08 NOTE — TELEPHONE ENCOUNTER
Pt states that [address] [place] [address] is her main doctor. Pt states that she thinks she has miscarried. LMP 11-24-20 and pt had a home positive on 12-31-20. On Jan 1 she started with cramping. On Jan 4 she started with spotting. On Tuesday she started with bleeding and passing clots. She was changing a pad every 2 hrs. Today her bleeding is lighter and she is changing a pad every 4-5 hrs. (Pt's pass blood type is A positive.)    Sent to [address] [place] [address] for recs.

## 2021-01-08 NOTE — TELEPHONE ENCOUNTER
Patient believes she had a miscarriage. She took a positive pregnancy test 12/31. A few days later she started cramping and now this past Tuesday she has started heavy bleeding. Has not had previous prenatal care and pregnancy was not confirmed other than the home test that was performed by the patient. She would like to speak to a nurse to discuss further steps.     Please Advise

## 2021-01-08 NOTE — TELEPHONE ENCOUNTER
Notified pt of  [facility] recs. Instructed pt on scheduling labs through [facility]. Advised pt to repeat 2nd quant at 48 hours and call Monday morning for result. Provided pain and bleeding precautions. Pt agrees.

## 2021-01-11 ENCOUNTER — LAB ENCOUNTER (OUTPATIENT)
Dept: LAB | Facility: HOSPITAL | Age: 31
End: 2021-01-11
Attending: OBSTETRICS & GYNECOLOGY
Payer: MEDICAID

## 2021-01-11 DIAGNOSIS — O20.9 BLEEDING IN EARLY PREGNANCY: ICD-10-CM

## 2021-01-11 LAB — B-HCG SERPL-ACNC: 33 MIU/ML

## 2021-01-11 PROCEDURE — 84702 CHORIONIC GONADOTROPIN TEST: CPT

## 2021-01-11 PROCEDURE — 36415 COLL VENOUS BLD VENIPUNCTURE: CPT

## 2021-01-13 ENCOUNTER — TELEPHONE (OUTPATIENT)
Dept: OBGYN CLINIC | Facility: CLINIC | Age: 31
End: 2021-01-13

## 2021-01-13 DIAGNOSIS — O03.9 SPONTANEOUS MISCARRIAGE: Primary | ICD-10-CM

## 2021-01-13 RX ORDER — METRONIDAZOLE 500 MG/1
TABLET ORAL
Qty: 14 TABLET | Refills: 0 | Status: SHIPPED | OUTPATIENT
Start: 2021-01-13 | End: 2021-02-10

## 2021-01-13 NOTE — TELEPHONE ENCOUNTER
Pt states she miscarried and thinks she has an infection, would like to speak with the nurse.  Please advise

## 2021-01-13 NOTE — TELEPHONE ENCOUNTER
Pt informed of results and recs and verbalized understanding. Pt will repeat next quant on next Monday.

## 2021-01-13 NOTE — TELEPHONE ENCOUNTER
----- Message from Beulah Montes De Oca DO sent at 1/11/2021  3:13 PM CST -----  C/w miscarriage.   Weekly HCGs to zero

## 2021-01-13 NOTE — TELEPHONE ENCOUNTER
Informed pt that rx sent for Flagyl. Pt will take it 1 tab po bid x 7 days.     SIA, have pt return in one week for hcg?

## 2021-01-13 NOTE — TELEPHONE ENCOUNTER
Prenatal pt's hcg's were the following    1-11 = 33.0    1-  8 = 49.0    LMP 11-24. Pt had a positive preg test on 12-31. Pt states that her bleeding was from 1/4 through 1/11. Pt states now she has brown spotting. Denies clots and denies any pain.

## 2021-01-18 ENCOUNTER — LAB ENCOUNTER (OUTPATIENT)
Dept: LAB | Facility: HOSPITAL | Age: 31
End: 2021-01-18
Attending: NURSE PRACTITIONER
Payer: MEDICAID

## 2021-01-18 DIAGNOSIS — O03.9 SPONTANEOUS MISCARRIAGE: ICD-10-CM

## 2021-01-18 LAB — B-HCG SERPL-ACNC: 25 MIU/ML

## 2021-01-18 PROCEDURE — 36415 COLL VENOUS BLD VENIPUNCTURE: CPT

## 2021-01-18 PROCEDURE — 84702 CHORIONIC GONADOTROPIN TEST: CPT

## 2021-02-01 ENCOUNTER — LAB ENCOUNTER (OUTPATIENT)
Dept: LAB | Facility: HOSPITAL | Age: 31
End: 2021-02-01
Attending: OBSTETRICS & GYNECOLOGY
Payer: MEDICAID

## 2021-02-01 DIAGNOSIS — O03.9 SPONTANEOUS MISCARRIAGE: ICD-10-CM

## 2021-02-01 LAB — B-HCG SERPL-ACNC: 6 MIU/ML

## 2021-02-01 PROCEDURE — 84702 CHORIONIC GONADOTROPIN TEST: CPT

## 2021-02-01 PROCEDURE — 36415 COLL VENOUS BLD VENIPUNCTURE: CPT

## 2021-02-10 ENCOUNTER — PATIENT MESSAGE (OUTPATIENT)
Dept: OBGYN CLINIC | Facility: CLINIC | Age: 31
End: 2021-02-10

## 2021-02-10 RX ORDER — METRONIDAZOLE 500 MG/1
TABLET ORAL
Qty: 14 TABLET | Refills: 0 | Status: SHIPPED | OUTPATIENT
Start: 2021-02-10 | End: 2021-03-11

## 2021-02-10 NOTE — TELEPHONE ENCOUNTER
From: Dereje Garcia  To: Steh Palmer DO  Sent: 2/10/2021 3:58 AM CST  Subject: Prescription Question    Hello I started taking my bv medicine you prescribed and didn't finish and I feel the bv coming back is it possible to send me or do I augusta

## 2021-03-11 ENCOUNTER — HOSPITAL ENCOUNTER (OUTPATIENT)
Age: 31
Discharge: HOME | End: 2021-03-11
Payer: MEDICAID

## 2021-03-11 VITALS
OXYGEN SATURATION: 100 % | HEART RATE: 75 BPM | TEMPERATURE: 98 F | SYSTOLIC BLOOD PRESSURE: 135 MMHG | RESPIRATION RATE: 18 BRPM | DIASTOLIC BLOOD PRESSURE: 80 MMHG

## 2021-03-11 DIAGNOSIS — N89.8 VAGINAL DISCHARGE: Primary | ICD-10-CM

## 2021-03-11 LAB
B-HCG UR QL: NEGATIVE
BILIRUB UR QL STRIP: NEGATIVE
C TRACH DNA SPEC QL NAA+PROBE: NEGATIVE
CLARITY UR: CLEAR
GLUCOSE UR STRIP-MCNC: NEGATIVE MG/DL
HGB UR QL STRIP: NEGATIVE
KETONES UR STRIP-MCNC: NEGATIVE MG/DL
LEUKOCYTE ESTERASE UR QL STRIP: NEGATIVE
N GONORRHOEA DNA SPEC QL NAA+PROBE: NEGATIVE
NITRITE UR QL STRIP: NEGATIVE
PH UR STRIP: 6 [PH]
SP GR UR STRIP: >=1.03
UROBILINOGEN UR STRIP-ACNC: <2 MG/DL

## 2021-03-11 PROCEDURE — 81002 URINALYSIS NONAUTO W/O SCOPE: CPT | Performed by: NURSE PRACTITIONER

## 2021-03-11 PROCEDURE — 81025 URINE PREGNANCY TEST: CPT | Performed by: NURSE PRACTITIONER

## 2021-03-11 PROCEDURE — 99213 OFFICE O/P EST LOW 20 MIN: CPT | Performed by: NURSE PRACTITIONER

## 2021-03-11 RX ORDER — FLUCONAZOLE 150 MG/1
150 TABLET ORAL ONCE
Qty: 25 TABLET | Refills: 0 | Status: SHIPPED | OUTPATIENT
Start: 2021-03-11 | End: 2021-03-11

## 2021-03-11 RX ORDER — NYSTATIN AND TRIAMCINOLONE ACETONIDE 100000; 1 [USP'U]/G; MG/G
1 OINTMENT TOPICAL 2 TIMES DAILY
Qty: 15 G | Refills: 0 | Status: SHIPPED | OUTPATIENT
Start: 2021-03-11

## 2021-03-11 RX ORDER — METRONIDAZOLE 500 MG/1
TABLET ORAL
Qty: 14 TABLET | Refills: 0 | Status: SHIPPED | OUTPATIENT
Start: 2021-03-11 | End: 2021-10-30

## 2021-03-11 NOTE — ED INITIAL ASSESSMENT (HPI)
Pt here with complaints of a possible yeast infection, pt states she has been having vaginal itching for the last 2 days,j pt denies any vaginal odor or discharge

## 2021-03-11 NOTE — ED PROVIDER NOTES
Patient Seen in: [facility]      History   Patient presents with:  Eval-G    Stated Complaint: vaginal infection    HPI/Subjective:   [place] José is a               Objective:   Past Medical History:   Diagnosis Date   • Anxiety sta URINALYSIS DIPSTICK    Collection Time: 03/11/21  9:29 AM   Result Value Ref Range    Urine Color Dark yellow Yellow    Urine Clarity Clear Clear    Specific Gravity, Urine >=1.030 1.005 - 1.030    PH, Urine 6.0 5.0 - 8.0    Protein urine Trace (A) Negativ

## 2021-03-11 NOTE — ED PROVIDER NOTES
Patient Seen in: [facility]      History   Patient presents with:  Magnolia-JAS    Stated Complaint: vaginal infection    HPI/Subjective:   Sri Cantubette is a 19-year-old female presenting to the immediate care complaining of vaginal discha [03/11/21 0915]   /80   Pulse 75   Resp 18   Temp 97.6 °F (36.4 °C)   Temp src    SpO2 100 %   O2 Device        Current:/80   Pulse 75   Temp 97.6 °F (36.4 °C)   Resp 18   LMP 03/02/2021   SpO2 100%         Physical Exam  Vitals and nursing not normal.         Behavior: Behavior normal.         Thought Content:  Thought content normal.         Judgment: Judgment normal.         ED Course     Labs Reviewed   [facility] POCT URINALYSIS DIPSTICK - Abnormal; Notable for the following components:       Result

## 2021-03-13 LAB
GENITAL VAGINOSIS SCREEN: POSITIVE
TRICHOMONAS SCREEN: NEGATIVE

## 2021-04-13 ENCOUNTER — IMMUNIZATION (OUTPATIENT)
Dept: LAB | Age: 31
End: 2021-04-13
Attending: HOSPITALIST
Payer: MEDICAID

## 2021-04-13 DIAGNOSIS — Z23 NEED FOR VACCINATION: Primary | ICD-10-CM

## 2021-04-13 PROCEDURE — 0001A SARSCOV2 VAC 30MCG/0.3ML IM: CPT

## 2021-04-29 ENCOUNTER — OFFICE VISIT (OUTPATIENT)
Dept: OBGYN CLINIC | Facility: CLINIC | Age: 31
End: 2021-04-29
Payer: MEDICAID

## 2021-04-29 VITALS
DIASTOLIC BLOOD PRESSURE: 85 MMHG | BODY MASS INDEX: 34 KG/M2 | SYSTOLIC BLOOD PRESSURE: 129 MMHG | WEIGHT: 187.38 LBS | HEART RATE: 83 BPM

## 2021-04-29 DIAGNOSIS — Z12.4 CERVICAL CANCER SCREENING: ICD-10-CM

## 2021-04-29 DIAGNOSIS — Z11.3 SCREENING FOR STD (SEXUALLY TRANSMITTED DISEASE): ICD-10-CM

## 2021-04-29 DIAGNOSIS — N89.8 VAGINAL DISCHARGE: ICD-10-CM

## 2021-04-29 DIAGNOSIS — Z01.419 WELL WOMAN EXAM WITH ROUTINE GYNECOLOGICAL EXAM: Primary | ICD-10-CM

## 2021-04-29 PROCEDURE — 3074F SYST BP LT 130 MM HG: CPT | Performed by: CLINICAL NURSE SPECIALIST

## 2021-04-29 PROCEDURE — 3079F DIAST BP 80-89 MM HG: CPT | Performed by: CLINICAL NURSE SPECIALIST

## 2021-04-29 PROCEDURE — 99395 PREV VISIT EST AGE 18-39: CPT | Performed by: CLINICAL NURSE SPECIALIST

## 2021-04-29 NOTE — PROGRESS NOTES
Adan Stephenson is a 27year old female [id number] Patient's last menstrual period was 04/02/2021. Patient presents with:  Gyn Exam: ANNUAL EXAM   Last annual exam was 2018. Last pap was 2016 and normal. Will do co-testing today.  Periods are regular and Strain:       Difficulty of Paying Living Expenses:   Food Insecurity:       Worried About [address] in the Last Year:       Ran Out of Food in the Last Year:   Transportation Needs:       Lack of Transportation (Medical):       Lack of Transport constipation  Genitourinary:  denies dysuria, incontinence, abnormal vaginal discharge, vaginal itching  Musculoskeletal:  denies back pain. Skin/Breast:  Denies any breast pain, lumps, or discharge.    Neurological:  denies headaches, extremity weakness o SCREEN; Future  -     CHLAMYDIA/GONOCOCCUS, RODRIGO; Future    Cervical cancer screening      Pap with HPV done. ASSCP guidelines reviewed in detail. Vaginal culture and STD testing done.  Declines blood STD testing  Safe sex and condoms discussed  Reviewed

## 2021-05-04 ENCOUNTER — IMMUNIZATION (OUTPATIENT)
Dept: LAB | Age: 31
End: 2021-05-04
Attending: HOSPITALIST
Payer: MEDICAID

## 2021-05-04 DIAGNOSIS — Z23 NEED FOR VACCINATION: Primary | ICD-10-CM

## 2021-05-04 PROCEDURE — 0002A SARSCOV2 VAC 30MCG/0.3ML IM: CPT

## 2021-06-08 ENCOUNTER — HOSPITAL ENCOUNTER (OUTPATIENT)
Age: 31
Discharge: HOME | End: 2021-06-08
Payer: MEDICAID

## 2021-06-08 VITALS
OXYGEN SATURATION: 99 % | HEIGHT: 62 IN | HEART RATE: 81 BPM | TEMPERATURE: 98 F | SYSTOLIC BLOOD PRESSURE: 124 MMHG | DIASTOLIC BLOOD PRESSURE: 74 MMHG | WEIGHT: 180 LBS | RESPIRATION RATE: 16 BRPM | BODY MASS INDEX: 33.13 KG/M2

## 2021-06-08 DIAGNOSIS — N89.8 VAGINAL DISCHARGE: Primary | ICD-10-CM

## 2021-06-08 PROCEDURE — 81025 URINE PREGNANCY TEST: CPT | Performed by: NURSE PRACTITIONER

## 2021-06-08 PROCEDURE — 81002 URINALYSIS NONAUTO W/O SCOPE: CPT | Performed by: NURSE PRACTITIONER

## 2021-06-08 PROCEDURE — 99213 OFFICE O/P EST LOW 20 MIN: CPT | Performed by: NURSE PRACTITIONER

## 2021-06-09 NOTE — ED PROVIDER NOTES
Patient Seen in: [facility]      History   Patient presents with:  Magnolia-JAS    Stated Complaint: Urinary Symptoms    HPI/Subjective:   Patient presents presents to the immediate care accompanied by self.   Patient reports after her last menstru wound.   Neurological: Negative for dizziness, light-headedness and headaches. Positive for stated complaint: Urinary Symptoms  Other systems are as noted in HPI. Constitutional and vital signs reviewed.       All other systems reviewed and negative Skin is not pale. Neurological:      General: No focal deficit present. Mental Status: She is alert and oriented to person, place, and time. Mental status is at baseline.    Psychiatric:         Mood and Affect: Mood normal.         Behavior: Judieth Bars guarding, no rebound tenderness, benign exam.  Patient has white discharge in vaginal vault, cervical os is closed, no adnexal tenderness, or palpable mass noted. No CMT. Vaginosis screening performed, patient declined any treatment for STDs.   Urinalysis

## 2021-06-24 ENCOUNTER — HOSPITAL ENCOUNTER (OUTPATIENT)
Age: 31
Discharge: HOME | End: 2021-06-24
Payer: MEDICAID

## 2021-06-24 VITALS
TEMPERATURE: 98 F | HEART RATE: 80 BPM | BODY MASS INDEX: 33.13 KG/M2 | RESPIRATION RATE: 20 BRPM | SYSTOLIC BLOOD PRESSURE: 104 MMHG | WEIGHT: 180 LBS | DIASTOLIC BLOOD PRESSURE: 78 MMHG | HEIGHT: 62 IN | OXYGEN SATURATION: 100 %

## 2021-06-24 DIAGNOSIS — N89.8 VAGINAL DISCHARGE: Primary | ICD-10-CM

## 2021-06-24 RX ORDER — METRONIDAZOLE 500 MG/1
500 TABLET ORAL 2 TIMES DAILY
Qty: 14 TABLET | Refills: 0 | Status: SHIPPED | OUTPATIENT
Start: 2021-06-24 | End: 2021-07-01

## 2021-06-24 NOTE — ED INITIAL ASSESSMENT (HPI)
Pt told to return for repeat swab for BV as swab did not reach lab during 6/8 visit. Pt c/o white vaginal discharge since 6/7. No urinary symptoms. Exam deferred to provider. Awake/alert. Breathing easy and even without distress. Speech clear.  Skin war

## 2021-06-24 NOTE — ED PROVIDER NOTES
Patient Seen in: [facility]      History   Patient presents with:  Recheck    Stated Complaint: recheck    HPI/Subjective:   HPI    49-year-old female here for vaginosis screening.   Patient was seen 10 days ago, had a vaginosis screening how Extraocular Movements: Extraocular movements intact. Pupils: Pupils are equal, round, and reactive to light. Cardiovascular:      Rate and Rhythm: Normal rate.    Pulmonary:      Effort: Pulmonary effort is normal.   Abdominal:      General: Abdomen

## 2021-08-18 ENCOUNTER — HOSPITAL ENCOUNTER (OUTPATIENT)
Age: 31
Discharge: HOME | End: 2021-08-18
Payer: MEDICAID

## 2021-08-18 VITALS
RESPIRATION RATE: 16 BRPM | DIASTOLIC BLOOD PRESSURE: 90 MMHG | SYSTOLIC BLOOD PRESSURE: 127 MMHG | BODY MASS INDEX: 34.04 KG/M2 | TEMPERATURE: 98 F | HEIGHT: 62 IN | OXYGEN SATURATION: 98 % | WEIGHT: 185 LBS | HEART RATE: 80 BPM

## 2021-08-18 DIAGNOSIS — N89.8 VAGINAL DISCHARGE: Primary | ICD-10-CM

## 2021-08-18 LAB
B-HCG UR QL: NEGATIVE
POCT BILIRUBIN URINE: NEGATIVE
POCT BLOOD URINE: NEGATIVE
POCT GLUCOSE URINE: NEGATIVE MG/DL
POCT KETONE URINE: NEGATIVE MG/DL
POCT LEUKOCYTE ESTERASE URINE: NEGATIVE
POCT NITRITE URINE: NEGATIVE
POCT PH URINE: 7 (ref 5–8)
POCT PROTEIN URINE: NEGATIVE MG/DL
POCT SPECIFIC GRAVITY URINE: 1.02
POCT URINE CLARITY: CLEAR
POCT URINE COLOR: YELLOW
POCT UROBILINOGEN URINE: 0.2 MG/DL

## 2021-08-18 PROCEDURE — 99214 OFFICE O/P EST MOD 30 MIN: CPT

## 2021-08-18 PROCEDURE — 87491 CHLMYD TRACH DNA AMP PROBE: CPT | Performed by: NURSE PRACTITIONER

## 2021-08-18 PROCEDURE — 81025 URINE PREGNANCY TEST: CPT

## 2021-08-18 PROCEDURE — 87480 CANDIDA DNA DIR PROBE: CPT | Performed by: NURSE PRACTITIONER

## 2021-08-18 PROCEDURE — 81002 URINALYSIS NONAUTO W/O SCOPE: CPT

## 2021-08-18 PROCEDURE — 87591 N.GONORRHOEAE DNA AMP PROB: CPT | Performed by: NURSE PRACTITIONER

## 2021-08-18 PROCEDURE — 87510 GARDNER VAG DNA DIR PROBE: CPT | Performed by: NURSE PRACTITIONER

## 2021-08-18 PROCEDURE — 87660 TRICHOMONAS VAGIN DIR PROBE: CPT | Performed by: NURSE PRACTITIONER

## 2021-08-18 RX ORDER — METRONIDAZOLE 500 MG/1
500 TABLET ORAL 2 TIMES DAILY
Qty: 14 TABLET | Refills: 0 | Status: SHIPPED | OUTPATIENT
Start: 2021-08-18 | End: 2021-08-25

## 2021-08-18 NOTE — ED PROVIDER NOTES
Patient Seen in: [facility] [place]      History   Patient presents with:  Eval-G    Stated Complaint: eval g    HPI/Subjective:   HPI  Patient is 25-year-old female past medical history of anxiety presents with 3-day history of vaginal discharge toxic-appearing or diaphoretic. HENT:      Mouth/Throat:      Mouth: Mucous membranes are moist.   Eyes:      Conjunctiva/sclera: Conjunctivae normal.   Cardiovascular:      Rate and Rhythm: Normal rate and regular rhythm. Pulses: Normal pulses. encounter diagnosis)     Disposition:  Discharge  8/18/2021  2:49 pm    Follow-up:  Leslie Perez, APRN  [address] [facility]   [phone]      if symptoms persist or worsen          Medications Prescribed:  Current Discharge Medi

## 2021-08-19 LAB
C TRACH DNA SPEC QL NAA+PROBE: NEGATIVE
N GONORRHOEA DNA SPEC QL NAA+PROBE: NEGATIVE

## 2021-10-30 ENCOUNTER — HOSPITAL ENCOUNTER (OUTPATIENT)
Age: 31
Discharge: HOME | End: 2021-10-30
Payer: MEDICAID

## 2021-10-30 VITALS
TEMPERATURE: 98 F | DIASTOLIC BLOOD PRESSURE: 83 MMHG | SYSTOLIC BLOOD PRESSURE: 129 MMHG | OXYGEN SATURATION: 100 % | RESPIRATION RATE: 20 BRPM | HEART RATE: 89 BPM

## 2021-10-30 DIAGNOSIS — N76.0 ACUTE VAGINITIS: Primary | ICD-10-CM

## 2021-10-30 PROBLEM — B96.89 BACTERIAL VAGINOSIS: Status: ACTIVE | Noted: 2021-10-30

## 2021-10-30 PROCEDURE — 81025 URINE PREGNANCY TEST: CPT | Performed by: EMERGENCY MEDICINE

## 2021-10-30 PROCEDURE — 81002 URINALYSIS NONAUTO W/O SCOPE: CPT | Performed by: EMERGENCY MEDICINE

## 2021-10-30 PROCEDURE — 99213 OFFICE O/P EST LOW 20 MIN: CPT | Performed by: EMERGENCY MEDICINE

## 2021-10-30 RX ORDER — TINIDAZOLE 500 MG/1
500 TABLET ORAL 2 TIMES DAILY
Qty: 14 TABLET | Refills: 0 | Status: SHIPPED | OUTPATIENT
Start: 2021-10-30 | End: 2021-11-06

## 2021-10-30 NOTE — ED PROVIDER NOTES
Patient Seen in: [facility]      History   Patient presents with:  Eval-G    Stated Complaint: Bateria infection    Subjective:   HPI  Collette Zhang is a 32year old female here for vaginal discharge.   Patient recently taken metronida Problem: Patient Care Overview  Goal: Plan of Care Review  Outcome: Ongoing (interventions implemented as appropriate)  Pt. On day 21 of xrt to pelvis.  Pt. Doing well.  C/o fatigue.  Denies N/V/D.  No c/o dizziness.       Conjunctiva/sclera: Conjunctivae normal.      Pupils: Pupils are equal, round, and reactive to light. Cardiovascular:      Rate and Rhythm: Normal rate. Pulmonary:      Effort: Pulmonary effort is normal. No respiratory distress.    Abdominal:      Gene Defer urine culture  No urinary symptoms  Declined STD testing    Discharge instructions as follows: We do not recommend rechecks or follow-ups in the urgent care. Please contact your primary care provider, or your OB/GYN.     Intravaginal SANDRA briggs medication. I Updated patient  on all findings, who verbalized understanding and agreement with the plan. I explained to the patient that emergent conditions may arise and to go to the ER for new, worsening or any persistent conditions.  All quest

## 2021-10-30 NOTE — ED INITIAL ASSESSMENT (HPI)
Pt here with concerns of possible BV, pt states she is having vaginal discharge and odor, pt denies any fever or abd pain

## 2021-11-09 ENCOUNTER — TELEPHONE (OUTPATIENT)
Dept: OBGYN CLINIC | Facility: CLINIC | Age: 31
End: 2021-11-09

## 2021-11-09 NOTE — TELEPHONE ENCOUNTER
Pt states she could come anytime on Friday or Saturday. Pt informed ALESSANDRO and KEMI are off both of those days. Pt offered appt with ALESSANDRO on Monday. Pt states she will go to urgent care.

## 2021-11-09 NOTE — TELEPHONE ENCOUNTER
Pt states she sees SUMMERK and KEMI. Pt states she has symptoms since 10/28 of vag odor, thick white discharge and no vaginal itching. Pt went to [facility] and cultures were negative per pt. Pt would like to be seen.     Sent to Jana Ricketts [phone] if you could add

## 2021-11-09 NOTE — TELEPHONE ENCOUNTER
[place] Cannot make 8 am appt. Directed patient to immediate care for swab and treatment if needed. Patient verbalized understanding.

## 2021-11-11 ENCOUNTER — HOSPITAL ENCOUNTER (OUTPATIENT)
Age: 31
Discharge: HOME | End: 2021-11-11
Payer: MEDICAID

## 2021-11-11 VITALS
TEMPERATURE: 98 F | OXYGEN SATURATION: 98 % | SYSTOLIC BLOOD PRESSURE: 135 MMHG | WEIGHT: 185 LBS | HEART RATE: 104 BPM | DIASTOLIC BLOOD PRESSURE: 98 MMHG | HEIGHT: 62 IN | RESPIRATION RATE: 16 BRPM | BODY MASS INDEX: 34.04 KG/M2

## 2021-11-11 DIAGNOSIS — N76.1 CHRONIC VAGINITIS: Primary | ICD-10-CM

## 2021-11-11 PROCEDURE — 81002 URINALYSIS NONAUTO W/O SCOPE: CPT | Performed by: NURSE PRACTITIONER

## 2021-11-11 PROCEDURE — 82962 GLUCOSE BLOOD TEST: CPT | Performed by: NURSE PRACTITIONER

## 2021-11-11 PROCEDURE — 99214 OFFICE O/P EST MOD 30 MIN: CPT | Performed by: NURSE PRACTITIONER

## 2021-11-11 PROCEDURE — 81025 URINE PREGNANCY TEST: CPT | Performed by: NURSE PRACTITIONER

## 2021-11-11 RX ORDER — FLUCONAZOLE 150 MG/1
150 TABLET ORAL ONCE
Qty: 2 TABLET | Refills: 0 | Status: SHIPPED | OUTPATIENT
Start: 2021-11-11 | End: 2021-11-11

## 2021-11-11 NOTE — ED INITIAL ASSESSMENT (HPI)
Pt with white vag discharge, itchiness since last week    Denies dysuria/hematuria/vag bleeding/abd pain/back pain/vom/fever

## 2021-11-12 RX ORDER — METRONIDAZOLE 500 MG/1
500 TABLET ORAL 2 TIMES DAILY
Qty: 14 TABLET | Refills: 0 | Status: SHIPPED | OUTPATIENT
Start: 2021-11-12 | End: 2021-11-19

## 2021-11-12 NOTE — ED PROVIDER NOTES
Patient Seen in: Immediate [address] [place] [address]      History   Patient presents with:  Eval-G    Stated Complaint: eval g    Subjective: This is a 51-year-old female with below stated medical history.   Presents to immediate care for thick, white, vagi for stated complaint: eval g  Other systems are as noted in HPI. Constitutional and vital signs reviewed. All other systems reviewed and negative except as noted above.     Physical Exam     ED Triage Vitals [11/11/21 1756]   BP (!) 135/98   Pulse 104 leg: No edema. Left lower leg: No edema. Skin:     General: Skin is warm and dry. Capillary Refill: Capillary refill takes less than 2 seconds. Findings: No rash.    Neurological:      Mental Status: She is alert and oriented to person, alok [id number]  [phone]    In 1 week            Medications Prescribed:  Current Discharge Medication List    START taking these medications    fluconazole (DIFLUCAN) 150 MG Oral Tab  Take 1 tablet (150 mg total) by mouth once for 1 dose.  Take 1 dose, if sympt

## 2022-11-11 ENCOUNTER — OFFICE VISIT (OUTPATIENT)
Dept: OBGYN CLINIC | Facility: CLINIC | Age: 32
End: 2022-11-11
Payer: MEDICAID

## 2022-11-11 VITALS
DIASTOLIC BLOOD PRESSURE: 80 MMHG | WEIGHT: 195.38 LBS | HEART RATE: 89 BPM | BODY MASS INDEX: 35.95 KG/M2 | SYSTOLIC BLOOD PRESSURE: 130 MMHG | HEIGHT: 62 IN

## 2022-11-11 DIAGNOSIS — N92.6 IRREGULAR PERIODS: ICD-10-CM

## 2022-11-11 DIAGNOSIS — Z11.3 ROUTINE SCREENING FOR STI (SEXUALLY TRANSMITTED INFECTION): ICD-10-CM

## 2022-11-11 DIAGNOSIS — Z01.419 WELL WOMAN EXAM WITH ROUTINE GYNECOLOGICAL EXAM: Primary | ICD-10-CM

## 2022-11-11 DIAGNOSIS — N89.8 VAGINAL DISCHARGE: ICD-10-CM

## 2022-11-11 LAB — CONTROL LINE PRESENT WITH A CLEAR BACKGROUND (YES/NO): YES YES/NO

## 2022-11-11 PROCEDURE — 3075F SYST BP GE 130 - 139MM HG: CPT | Performed by: NURSE PRACTITIONER

## 2022-11-11 PROCEDURE — 81025 URINE PREGNANCY TEST: CPT | Performed by: NURSE PRACTITIONER

## 2022-11-11 PROCEDURE — 3079F DIAST BP 80-89 MM HG: CPT | Performed by: NURSE PRACTITIONER

## 2022-11-11 PROCEDURE — 3008F BODY MASS INDEX DOCD: CPT | Performed by: NURSE PRACTITIONER

## 2022-11-11 PROCEDURE — 99395 PREV VISIT EST AGE 18-39: CPT | Performed by: NURSE PRACTITIONER

## 2022-11-13 LAB
GENITAL VAGINOSIS SCREEN: POSITIVE
TRICHOMONAS SCREEN: NEGATIVE

## 2022-11-14 ENCOUNTER — TELEPHONE (OUTPATIENT)
Dept: OBGYN CLINIC | Facility: CLINIC | Age: 32
End: 2022-11-14

## 2022-11-14 LAB
C TRACH DNA SPEC QL NAA+PROBE: NEGATIVE
N GONORRHOEA DNA SPEC QL NAA+PROBE: NEGATIVE

## 2022-11-14 RX ORDER — METRONIDAZOLE 500 MG/1
500 TABLET ORAL 2 TIMES DAILY
Qty: 14 TABLET | Refills: 0 | Status: SHIPPED | OUTPATIENT
Start: 2022-11-14 | End: 2022-11-21

## 2022-11-14 NOTE — TELEPHONE ENCOUNTER
----- Message from NATHALIA Guevara sent at 11/14/2022  8:11 AM CST -----  +BV - please rx metronidazole vaginal x 5 nights OR oral 500mg PO BID x 7 days    NATHALIA Guevara

## 2022-11-14 NOTE — TELEPHONE ENCOUNTER
Pt informed of results and recs. Pt chose flagyl. Pt advised to avoid alcohol while on med. Pharmacy verified. Rx sent.

## 2022-11-18 ENCOUNTER — OFFICE VISIT (OUTPATIENT)
Dept: INTERNAL MEDICINE CLINIC | Facility: CLINIC | Age: 32
End: 2022-11-18
Payer: MEDICAID

## 2022-11-18 VITALS
DIASTOLIC BLOOD PRESSURE: 92 MMHG | WEIGHT: 199 LBS | HEART RATE: 75 BPM | SYSTOLIC BLOOD PRESSURE: 140 MMHG | HEIGHT: 62 IN | BODY MASS INDEX: 36.62 KG/M2

## 2022-11-18 DIAGNOSIS — Z28.21 IMMUNIZATION NOT CARRIED OUT BECAUSE OF PATIENT REFUSAL: ICD-10-CM

## 2022-11-18 DIAGNOSIS — M67.912 ROTATOR CUFF DISORDER, LEFT: Primary | ICD-10-CM

## 2022-11-18 PROCEDURE — 3077F SYST BP >= 140 MM HG: CPT | Performed by: INTERNAL MEDICINE

## 2022-11-18 PROCEDURE — 3008F BODY MASS INDEX DOCD: CPT | Performed by: INTERNAL MEDICINE

## 2022-11-18 PROCEDURE — 99213 OFFICE O/P EST LOW 20 MIN: CPT | Performed by: INTERNAL MEDICINE

## 2022-11-18 PROCEDURE — 3080F DIAST BP >= 90 MM HG: CPT | Performed by: INTERNAL MEDICINE

## 2022-11-18 RX ORDER — MELOXICAM 15 MG/1
15 TABLET ORAL DAILY
Qty: 14 TABLET | Refills: 0 | Status: SHIPPED | OUTPATIENT
Start: 2022-11-18

## 2022-11-21 ENCOUNTER — TELEPHONE (OUTPATIENT)
Dept: OBGYN CLINIC | Facility: CLINIC | Age: 32
End: 2022-11-21

## 2022-11-21 RX ORDER — FLUCONAZOLE 150 MG/1
150 TABLET ORAL ONCE
Qty: 1 TABLET | Refills: 0 | Status: SHIPPED | OUTPATIENT
Start: 2022-11-21 | End: 2022-11-21

## 2022-11-21 NOTE — TELEPHONE ENCOUNTER
Pt is currently on Flagyl for BV, has 2 pills left. Reports now having white thicker vaginal discharge with itching since yesterday. Denies odor. Pt is requesting Diflucan. States Monistat burns her. Message to EMB to advise.

## 2022-11-21 NOTE — TELEPHONE ENCOUNTER
Pt called and informed that order for Diflucan was sent to [facility]. Pt states understanding.

## 2022-12-22 ENCOUNTER — TELEPHONE (OUTPATIENT)
Dept: PHYSICAL THERAPY | Facility: HOSPITAL | Age: 32
End: 2022-12-22

## 2022-12-22 ENCOUNTER — APPOINTMENT (OUTPATIENT)
Dept: PHYSICAL THERAPY | Age: 32
End: 2022-12-22
Attending: INTERNAL MEDICINE
Payer: MEDICAID

## 2022-12-29 ENCOUNTER — TELEPHONE (OUTPATIENT)
Dept: PHYSICAL THERAPY | Age: 32
End: 2022-12-29

## 2022-12-29 ENCOUNTER — APPOINTMENT (OUTPATIENT)
Dept: PHYSICAL THERAPY | Age: 32
End: 2022-12-29
Attending: INTERNAL MEDICINE
Payer: MEDICAID

## 2022-12-29 NOTE — TELEPHONE ENCOUNTER
Called pt due to no show. Pt thought appt was next week.  Eval was rescheduled for tomorrow 12/30 per pt request with Jonathan Sifuentess, PT.

## 2022-12-30 ENCOUNTER — OFFICE VISIT (OUTPATIENT)
Dept: PHYSICAL THERAPY | Age: 32
End: 2022-12-30
Attending: INTERNAL MEDICINE
Payer: MEDICAID

## 2022-12-30 PROCEDURE — 97110 THERAPEUTIC EXERCISES: CPT

## 2022-12-30 PROCEDURE — 97161 PT EVAL LOW COMPLEX 20 MIN: CPT

## 2023-01-03 ENCOUNTER — APPOINTMENT (OUTPATIENT)
Dept: PHYSICAL THERAPY | Age: 33
End: 2023-01-03
Attending: INTERNAL MEDICINE

## 2023-01-03 ENCOUNTER — TELEPHONE (OUTPATIENT)
Dept: PHYSICAL THERAPY | Facility: HOSPITAL | Age: 33
End: 2023-01-03

## 2023-01-04 ENCOUNTER — APPOINTMENT (OUTPATIENT)
Dept: PHYSICAL THERAPY | Age: 33
End: 2023-01-04
Attending: INTERNAL MEDICINE

## 2023-01-06 ENCOUNTER — TELEPHONE (OUTPATIENT)
Dept: PHYSICAL THERAPY | Age: 33
End: 2023-01-06

## 2023-01-06 ENCOUNTER — APPOINTMENT (OUTPATIENT)
Dept: PHYSICAL THERAPY | Age: 33
End: 2023-01-06
Attending: INTERNAL MEDICINE

## 2023-01-09 ENCOUNTER — APPOINTMENT (OUTPATIENT)
Dept: PHYSICAL THERAPY | Age: 33
End: 2023-01-09
Attending: INTERNAL MEDICINE

## 2023-01-11 ENCOUNTER — APPOINTMENT (OUTPATIENT)
Dept: PHYSICAL THERAPY | Age: 33
End: 2023-01-11
Attending: INTERNAL MEDICINE

## 2023-01-12 ENCOUNTER — APPOINTMENT (OUTPATIENT)
Dept: PHYSICAL THERAPY | Age: 33
End: 2023-01-12
Attending: INTERNAL MEDICINE
Payer: MEDICAID

## 2023-01-13 ENCOUNTER — OFFICE VISIT (OUTPATIENT)
Dept: PHYSICAL THERAPY | Age: 33
End: 2023-01-13
Attending: INTERNAL MEDICINE
Payer: MEDICAID

## 2023-01-13 PROCEDURE — 97110 THERAPEUTIC EXERCISES: CPT

## 2023-01-13 PROCEDURE — 97140 MANUAL THERAPY 1/> REGIONS: CPT

## 2023-01-16 ENCOUNTER — TELEPHONE (OUTPATIENT)
Dept: PHYSICAL THERAPY | Age: 33
End: 2023-01-16

## 2023-01-16 ENCOUNTER — APPOINTMENT (OUTPATIENT)
Dept: PHYSICAL THERAPY | Age: 33
End: 2023-01-16
Attending: INTERNAL MEDICINE
Payer: MEDICAID

## 2023-01-16 ENCOUNTER — APPOINTMENT (OUTPATIENT)
Dept: PHYSICAL THERAPY | Age: 33
End: 2023-01-16
Attending: INTERNAL MEDICINE

## 2023-01-19 ENCOUNTER — APPOINTMENT (OUTPATIENT)
Dept: PHYSICAL THERAPY | Age: 33
End: 2023-01-19
Attending: INTERNAL MEDICINE
Payer: MEDICAID

## 2023-01-19 ENCOUNTER — TELEPHONE (OUTPATIENT)
Dept: PHYSICAL THERAPY | Age: 33
End: 2023-01-19

## 2023-01-23 ENCOUNTER — APPOINTMENT (OUTPATIENT)
Dept: PHYSICAL THERAPY | Age: 33
End: 2023-01-23
Attending: INTERNAL MEDICINE
Payer: MEDICAID

## 2023-01-25 ENCOUNTER — PATIENT MESSAGE (OUTPATIENT)
Dept: INTERNAL MEDICINE CLINIC | Facility: CLINIC | Age: 33
End: 2023-01-25

## 2023-01-26 ENCOUNTER — APPOINTMENT (OUTPATIENT)
Dept: PHYSICAL THERAPY | Age: 33
End: 2023-01-26
Attending: INTERNAL MEDICINE
Payer: MEDICAID

## 2023-01-28 ENCOUNTER — NURSE TRIAGE (OUTPATIENT)
Dept: INTERNAL MEDICINE CLINIC | Facility: CLINIC | Age: 33
End: 2023-01-28

## 2023-01-31 ENCOUNTER — PATIENT MESSAGE (OUTPATIENT)
Dept: INTERNAL MEDICINE CLINIC | Facility: CLINIC | Age: 33
End: 2023-01-31

## 2023-02-01 NOTE — TELEPHONE ENCOUNTER
Unfortunately, I am listed as this patient's PCP in error as I am a Pediatric Nurse Practitioner. When contact is made with this patient please encourage them to identify a PCP. Thank you.

## 2023-02-01 NOTE — TELEPHONE ENCOUNTER
OFFICE STAFF=please assist,thanks. Wrong sent to Erika Peterson, =please disregard. Re sent to the right provider. Brian Forrester RN 1/31/2023  5:54 PM CST        ----- Message -----  From: Tawny Lundborg  Sent: 1/31/2023   1:16 PM CST  To: Em Rn Triage  Subject: Return to work letter                            Good afternoon I got a new letter of return for work yesterday it only shows the day I can return.  Can it be written like the first letter stating the first day I took off 12/28 and return 2/06 as stated on the new letter

## 2023-02-03 NOTE — PROGRESS NOTES
HPI:    Patient ID: Aura Boucher is a 27year old female.     HPI   Patient presents for follow up of anxiety, notes improvement with xanax but states that shes feeling anxious both in small spaces but now just in general. Recently having vivid luz maria Sitting, Cuff Size: large)   Pulse 77   Resp 16   Ht 5' 2\" (1.575 m)   Wt 193 lb (87.5 kg)   LMP 10/29/2020   BMI 35.30 kg/m²   Body mass index is 35.3 kg/m². Physical Exam    Constitutional: She is oriented to person, place, and time and thin.  She appea Bcc Micronodular Histology Text: Emanating from the epidermis and\\ninfiltrating the dermis are irregularly shaped islands of basaloid keratinocytes. The cells\\nhave scant cytoplasm and round dark nuclei. Mitotic figures and apoptotic bodies are\\nevident. The nuclei at the periphery of the islands have a palisaded arrangement. The\\nislands are associated with a fibromyxoid stroma and there is cleft formation between\\nsome of the islands and stroma. In areas the tumor breaks up into a small,\\nmicronodular, infiltrative growth pattern with deeper extension into the.)

## 2023-02-10 ENCOUNTER — LAB ENCOUNTER (OUTPATIENT)
Dept: LAB | Facility: HOSPITAL | Age: 33
End: 2023-02-10
Attending: INTERNAL MEDICINE
Payer: MEDICAID

## 2023-02-10 DIAGNOSIS — F41.1 GAD (GENERALIZED ANXIETY DISORDER): ICD-10-CM

## 2023-02-10 LAB — TSI SER-ACNC: 0.8 MIU/ML (ref 0.36–3.74)

## 2023-02-10 PROCEDURE — 36415 COLL VENOUS BLD VENIPUNCTURE: CPT

## 2023-02-10 PROCEDURE — 84443 ASSAY THYROID STIM HORMONE: CPT

## 2023-08-01 ENCOUNTER — OFFICE VISIT (OUTPATIENT)
Dept: OBGYN CLINIC | Facility: CLINIC | Age: 33
End: 2023-08-01

## 2023-08-01 VITALS
HEART RATE: 89 BPM | DIASTOLIC BLOOD PRESSURE: 82 MMHG | BODY MASS INDEX: 37 KG/M2 | WEIGHT: 200.63 LBS | SYSTOLIC BLOOD PRESSURE: 138 MMHG

## 2023-08-01 DIAGNOSIS — N89.8 VAGINAL DISCHARGE: Primary | ICD-10-CM

## 2023-08-01 PROCEDURE — 3075F SYST BP GE 130 - 139MM HG: CPT | Performed by: NURSE PRACTITIONER

## 2023-08-01 PROCEDURE — 3079F DIAST BP 80-89 MM HG: CPT | Performed by: NURSE PRACTITIONER

## 2023-08-01 PROCEDURE — 99212 OFFICE O/P EST SF 10 MIN: CPT | Performed by: NURSE PRACTITIONER

## 2023-08-02 LAB
C TRACH DNA SPEC QL NAA+PROBE: NEGATIVE
N GONORRHOEA DNA SPEC QL NAA+PROBE: NEGATIVE

## 2023-08-03 ENCOUNTER — TELEPHONE (OUTPATIENT)
Dept: OBGYN CLINIC | Facility: CLINIC | Age: 33
End: 2023-08-03

## 2023-08-03 LAB
GENITAL VAGINOSIS SCREEN: NEGATIVE
TRICHOMONAS SCREEN: NEGATIVE

## 2023-08-03 NOTE — TELEPHONE ENCOUNTER
----- Message from NATHALIA Reeder sent at 8/3/2023  3:03 PM CDT -----  +candida.  Please rx diflucan 150 mg PO x 1 dose    NATHALIA Reeder

## 2023-08-03 NOTE — TELEPHONE ENCOUNTER
Pt informed of results and recs. Pt states normally one dose of diflucan does not work and pt is asking for a second dose. Message to EMB.

## 2023-08-10 ENCOUNTER — PATIENT MESSAGE (OUTPATIENT)
Dept: OBGYN CLINIC | Facility: CLINIC | Age: 33
End: 2023-08-10

## 2023-08-10 RX ORDER — FLUCONAZOLE 200 MG/1
200 TABLET ORAL
Qty: 2 TABLET | Refills: 0 | Status: SHIPPED | OUTPATIENT
Start: 2023-08-10

## 2023-08-10 NOTE — TELEPHONE ENCOUNTER
8/1/23 genital vag screen 3+ candida albicans. EMB gave recs for Diflucan 150 mg x 1 dose. Pt called back stating one dose does not typically work for her. Does not appear any rx was ever sent. We also now have issue of back-order of 150 mg dosing. To Jana Ricketts [phone] on-call in EMB's absence- okay for 2 doses of diflucan? Please advise on dose.

## 2023-08-24 ENCOUNTER — LAB ENCOUNTER (OUTPATIENT)
Dept: LAB | Facility: HOSPITAL | Age: 33
End: 2023-08-24
Attending: EMERGENCY MEDICINE
Payer: MEDICAID

## 2023-08-24 ENCOUNTER — OFFICE VISIT (OUTPATIENT)
Dept: OBGYN CLINIC | Facility: CLINIC | Age: 33
End: 2023-08-24

## 2023-08-24 VITALS
WEIGHT: 203.81 LBS | BODY MASS INDEX: 37 KG/M2 | DIASTOLIC BLOOD PRESSURE: 71 MMHG | SYSTOLIC BLOOD PRESSURE: 124 MMHG | HEART RATE: 106 BPM

## 2023-08-24 DIAGNOSIS — N89.8 VAGINAL DISCHARGE: Primary | ICD-10-CM

## 2023-08-24 DIAGNOSIS — R39.15 URINARY URGENCY: ICD-10-CM

## 2023-08-24 LAB
BILIRUB UR QL: NEGATIVE
CLARITY UR: CLEAR
GLUCOSE UR-MCNC: NORMAL MG/DL
HGB UR QL STRIP.AUTO: NEGATIVE
KETONES UR-MCNC: NEGATIVE MG/DL
LEUKOCYTE ESTERASE UR QL STRIP.AUTO: NEGATIVE
NITRITE UR QL STRIP.AUTO: NEGATIVE
PH UR: 7 [PH] (ref 5–8)
SP GR UR STRIP: 1.03 (ref 1–1.03)
UROBILINOGEN UR STRIP-ACNC: NORMAL

## 2023-08-24 PROCEDURE — 99212 OFFICE O/P EST SF 10 MIN: CPT | Performed by: NURSE PRACTITIONER

## 2023-08-24 PROCEDURE — 3074F SYST BP LT 130 MM HG: CPT | Performed by: NURSE PRACTITIONER

## 2023-08-24 PROCEDURE — 87086 URINE CULTURE/COLONY COUNT: CPT

## 2023-08-24 PROCEDURE — 81001 URINALYSIS AUTO W/SCOPE: CPT

## 2023-08-24 PROCEDURE — 3078F DIAST BP <80 MM HG: CPT | Performed by: NURSE PRACTITIONER

## 2023-08-27 LAB
GENITAL VAGINOSIS SCREEN: POSITIVE
TRICHOMONAS SCREEN: NEGATIVE

## 2023-08-28 RX ORDER — METRONIDAZOLE 500 MG/1
500 TABLET ORAL 2 TIMES DAILY
Qty: 14 TABLET | Refills: 0 | Status: SHIPPED | OUTPATIENT
Start: 2023-08-28 | End: 2023-09-04

## 2024-06-07 ENCOUNTER — HOSPITAL ENCOUNTER (OUTPATIENT)
Age: 34
Discharge: HOME | End: 2024-06-07
Payer: MEDICAID

## 2024-06-07 VITALS
RESPIRATION RATE: 19 BRPM | TEMPERATURE: 98 F | SYSTOLIC BLOOD PRESSURE: 131 MMHG | DIASTOLIC BLOOD PRESSURE: 92 MMHG | HEIGHT: 62 IN | BODY MASS INDEX: 34.96 KG/M2 | OXYGEN SATURATION: 100 % | HEART RATE: 76 BPM | WEIGHT: 190 LBS

## 2024-06-07 DIAGNOSIS — N89.8 VAGINAL DISCHARGE: Primary | ICD-10-CM

## 2024-06-07 LAB
B-HCG UR QL: NEGATIVE
COLOR UR: YELLOW
GLUCOSE UR STRIP-MCNC: NEGATIVE MG/DL
KETONES UR STRIP-MCNC: NEGATIVE MG/DL
LEUKOCYTE ESTERASE UR QL STRIP: NEGATIVE
NITRITE UR QL STRIP: NEGATIVE
PH UR STRIP: 6 [PH]
PROT UR STRIP-MCNC: NEGATIVE MG/DL
SP GR UR STRIP: >=1.03
UROBILINOGEN UR STRIP-ACNC: <2 MG/DL

## 2024-06-07 PROCEDURE — 81002 URINALYSIS NONAUTO W/O SCOPE: CPT | Performed by: PHYSICIAN ASSISTANT

## 2024-06-07 PROCEDURE — 99214 OFFICE O/P EST MOD 30 MIN: CPT | Performed by: PHYSICIAN ASSISTANT

## 2024-06-07 PROCEDURE — 81025 URINE PREGNANCY TEST: CPT | Performed by: PHYSICIAN ASSISTANT

## 2024-06-07 RX ORDER — METRONIDAZOLE 500 MG/1
500 TABLET ORAL 2 TIMES DAILY
Qty: 14 TABLET | Refills: 0 | Status: SHIPPED | OUTPATIENT
Start: 2024-06-07 | End: 2024-06-14

## 2024-06-07 RX ORDER — FLUCONAZOLE 200 MG/1
200 TABLET ORAL DAILY
Qty: 1 TABLET | Refills: 0 | Status: SHIPPED | OUTPATIENT
Start: 2024-06-07

## 2024-06-07 NOTE — ED PROVIDER NOTES
Patient Seen in: [facility]      History     Chief Complaint   Patient presents with    Eval-G     Stated Complaint: discharge    Subjective:   HPI    33-year-old female here with complaint of vaginal discharge that started last week.  Patient has a new sexual partner.  Patient denies chest pain, shortness of breath, cough, abdominal pain, nausea, vomiting or diarrhea.  Patient denies dysuria, hematuria or flank pain.  Patient has had BV in the past and it feels like that.  Afebrile.    Objective:   Past Medical History:    Anxiety state    Decorative tattoo    Varicella    Visual impairment    eyeglasses,contacts              Past Surgical History:   Procedure Laterality Date          x 2    D & c      Treat ectopic preg,abd preg                The patient's medication list, past medical history and social history elements  as listed in today's nurse's notes are reviewed and agree.   The patient's family history is reviewed and is noncontributory to the presenting problem, except as indicated as above.     Social History     Socioeconomic History    Marital status: Single   Tobacco Use    Smoking status: Never    Smokeless tobacco: Never   Vaping Use    Vaping status: Never Used   Substance and Sexual Activity    Alcohol use: Yes     Alcohol/week: 0.0 standard drinks of alcohol     Comment: occ    Drug use: No     Comment: occ     Sexual activity: Yes     Partners: Male     Birth control/protection: Condom     Comment: same partner              Review of Systems    Positive for stated complaint: discharge  Other systems are as noted in HPI.  Constitutional and vital signs reviewed.      All other systems reviewed and negative except as noted above.    Physical Exam     ED Triage Vitals [24 1037]   BP (!) 131/92   Pulse 76   Resp 19   Temp 97.8 °F (36.6 °C)   Temp src Temporal   SpO2 100 %   O2 Device None (Room air)       Current Vitals:   Vital Signs  BP: (!) 131/92  Pulse:  76  Resp: 19  Temp: 97.8 °F (36.6 °C)  Temp src: Temporal    Oxygen Therapy  SpO2: 100 %  O2 Device: None (Room air)            Physical Exam  Vitals and nursing note reviewed. Exam conducted with a chaperone present.   Constitutional:       Appearance: Normal appearance. She is well-developed.   HENT:      Head: Normocephalic.      Right Ear: External ear normal.      Left Ear: External ear normal.      Nose: Nose normal.      Mouth/Throat:      Mouth: Mucous membranes are moist.   Eyes:      Conjunctiva/sclera: Conjunctivae normal.      Pupils: Pupils are equal, round, and reactive to light.   Cardiovascular:      Rate and Rhythm: Normal rate and regular rhythm.      Heart sounds: Normal heart sounds.   Pulmonary:      Effort: Pulmonary effort is normal.      Breath sounds: Normal breath sounds.   Genitourinary:     Exam position: Knee-chest position.      Vagina: Vaginal discharge present.      Cervix: Normal. No cervical motion tenderness.      Comments: Copious amounts of whitish discharge no CMT  Musculoskeletal:      Cervical back: Normal range of motion and neck supple.   Skin:     General: Skin is warm.      Capillary Refill: Capillary refill takes less than 2 seconds.   Neurological:      General: No focal deficit present.      Mental Status: She is alert and oriented to person, place, and time.   Psychiatric:         Mood and Affect: Mood normal.         Behavior: Behavior normal.         Thought Content: Thought content normal.         Judgment: Judgment normal.             ED Course     Labs Reviewed   [facility] POCT URINALYSIS DIPSTICK - Abnormal; Notable for the following components:       Result Value    Urine Clarity Slightly cloudy (*)     Bilirubin, Urine Small (*)     Blood, Urine Trace-Intact (*)     All other components within normal limits   POCT PREGNANCY URINE - Normal   VAGINITIS VAGINOSIS PCR PANEL   CHLAMYDIA/GONOCOCCUS, RODRIGO                      MDM   Clinical Impression: vaginal  discharge  Course of Treatment:   Take the Diflucan as prescribed.  Will send in the Flagyl but check MyChart for the results.  Will follow-up with any of the other cultures.  Recommend no sexual activity for the next couple weeks.  No douching scented lotions detergents or products.  Showers not baths.  Make a follow-up appointment with a gynecologist for further evaluation and treatment.    The patient is encouraged to return if any concerning symptoms arise. Additional verbal discharge instructions are given and discussed. Discharge medications are discussed. The patient is in good condition throughout the visit today and remains so upon discharge. I discuss the plan of care with the patient, who expresses understanding. All questions and concerns are addressed to the patient's satisfaction prior to discharge today.  Previous conversations with PCP and charts were reviewed.                                       Medical Decision Making      Disposition and Plan     Clinical Impression:  1. Vaginal discharge         Disposition:  Discharge  6/7/2024 11:35 am    Follow-up:  Shawnee Ying, APRN  [address] [address]  [phone]                Medications Prescribed:  Current Discharge Medication List        START taking these medications    Details   metRONIDAZOLE 500 MG Oral Tab Take 1 tablet (500 mg total) by mouth in the morning and 1 tablet (500 mg total) before bedtime. Do all this for 7 days.  Qty: 14 tablet, Refills: 0      !! fluconazole (DIFLUCAN) 200 MG Oral Tab Take 1 tablet (200 mg total) by mouth daily.  Qty: 1 tablet, Refills: 0       !! - Potential duplicate medications found. Please discuss with provider.

## 2024-06-07 NOTE — DISCHARGE INSTRUCTIONS
Please return to the ER/clinic if symptoms worsen. Follow-up with your PCP in 24-48 hours as needed.    Take the Diflucan as prescribed.  Will send in the Flagyl but check MyChart for the results.  Will follow-up with any of the other cultures.  Recommend no sexual activity for the next couple weeks.  No douching scented lotions detergents or products.  Showers not baths.  Make a follow-up appointment with a gynecologist for further evaluation and treatment.

## 2024-06-07 NOTE — ED INITIAL ASSESSMENT (HPI)
Pt began 1 week ago with a milky discharge and began with a new partner 1 month ago.  Denies any urinary symptoms at this time.  Pt denies any pain or itching

## 2024-06-08 LAB
BV BACTERIA DNA VAG QL NAA+PROBE: POSITIVE
C GLABRATA DNA VAG QL NAA+PROBE: NEGATIVE
C KRUSEI DNA VAG QL NAA+PROBE: NEGATIVE
CANDIDA DNA VAG QL NAA+PROBE: NEGATIVE
T VAGINALIS DNA VAG QL NAA+PROBE: NEGATIVE

## 2024-06-10 LAB
C TRACH DNA SPEC QL NAA+PROBE: NEGATIVE
N GONORRHOEA DNA SPEC QL NAA+PROBE: NEGATIVE

## 2024-08-07 ENCOUNTER — OFFICE VISIT (OUTPATIENT)
Dept: OBGYN CLINIC | Facility: CLINIC | Age: 34
End: 2024-08-07
Payer: MEDICAID

## 2024-08-07 VITALS
WEIGHT: 200 LBS | BODY MASS INDEX: 37.28 KG/M2 | SYSTOLIC BLOOD PRESSURE: 109 MMHG | HEART RATE: 84 BPM | HEIGHT: 61.5 IN | DIASTOLIC BLOOD PRESSURE: 71 MMHG

## 2024-08-07 DIAGNOSIS — Z11.3 SCREENING EXAMINATION FOR STI: ICD-10-CM

## 2024-08-07 DIAGNOSIS — B96.89 BV (BACTERIAL VAGINOSIS): ICD-10-CM

## 2024-08-07 DIAGNOSIS — N76.0 BV (BACTERIAL VAGINOSIS): ICD-10-CM

## 2024-08-07 DIAGNOSIS — Z12.4 SCREENING FOR CERVICAL CANCER: ICD-10-CM

## 2024-08-07 DIAGNOSIS — Z01.419 WELL WOMAN EXAM WITH ROUTINE GYNECOLOGICAL EXAM: Primary | ICD-10-CM

## 2024-08-07 PROCEDURE — 99395 PREV VISIT EST AGE 18-39: CPT | Performed by: NURSE PRACTITIONER

## 2024-08-07 RX ORDER — METRONIDAZOLE 500 MG/1
500 TABLET ORAL 3 TIMES DAILY
Qty: 14 TABLET | Refills: 0 | Status: SHIPPED | OUTPATIENT
Start: 2024-08-07

## 2024-08-07 NOTE — PROGRESS NOTES
[facility]    Obstetrics and Gynecology    Chief Complaint   Patient presents with    Gyn Exam     ANNUAL EXAM       Naomi Stafford is a 34 year old female  Patient's last menstrual period was 07/15/2024 (exact date). presenting for annual gynecology exam.  Periods every month , normal flow, no pain.    She reports some increased discharge. No odor or irritation.  No pelv ic pain  No concerns with intercourse, using condoms.  Desires testing    Pap:2021 NILM, neg HPV; due cotesting in   Contraception:none  Mammo: never    Colonoscopy: n/a    OBSTETRICS HISTORY:  OB History    Para Term  AB Living   6 2 2 0 3 2   SAB IAB Ectopic Multiple Live Births   1 1 1 0 2       GYNE HISTORY:  Menarche: 11 (2024 10:30 AM)  Period Cycle (Days): monthly (2024 10:30 AM)  Period Duration (Days): 4-5 days (2024 10:30 AM)  Period Flow: NORMAL (2024 10:30 AM)  Use of Birth Control (if yes, specify type): Condoms (2024 10:30 AM)  Pap Date: 21 (2024 10:30 AM)  Pap Result Notes: NEG PAP/HPV (2024 10:30 AM)      History   Sexual Activity    Sexual activity: Yes    Partners: Male    Birth control/ protection: Condom     Comment: same partner           Latest Ref Rng & Units 2021    11:13 AM 2016     2:08 PM   RECENT PAP RESULTS   Thinprep Pap Negative for intraepithelial lesion or malignancy Negative for intraepithelial lesion or malignancy  Negative for intraepithelial lesion or malignancy    HPV Negative Negative           MEDICAL HISTORY:  Past Medical History:    Anxiety state    Decorative tattoo    Varicella    Visual impairment    eyeglasses,contacts     Past Surgical History:   Procedure Laterality Date          x 2    D & c      Treat ectopic preg,abd preg         SOCIAL HISTORY:  Social History     Socioeconomic History    Marital status: Single     Spouse name: Not on file    Number of children: Not on file    Years of education: Not on  file    Highest education level: Not on file   Occupational History    Not on file   Tobacco Use    Smoking status: Never    Smokeless tobacco: Never   Vaping Use    Vaping status: Never Used   Substance and Sexual Activity    Alcohol use: Yes     Alcohol/week: 0.0 standard drinks of alcohol     Comment: occ    Drug use: No     Comment: occ     Sexual activity: Yes     Partners: Male     Birth control/protection: Condom     Comment: same partner   Other Topics Concern    Not on file   Social History Narrative    Not on file     Social Determinants of Health     Financial Resource Strain: Not on file   Food Insecurity: Not on file   Transportation Needs: Not on file   Physical Activity: Not on file   Stress: Not on file   Social Connections: Not on file   Housing Stability: Not on file         Depression Screening (PHQ-2/PHQ-9): Over the LAST 2 WEEKS   Little interest or pleasure in doing things: Not at all    Feeling down, depressed, or hopeless: Not at all    PHQ-2 SCORE: 0           FAMILY HISTORY:  Family History   Problem Relation Age of Onset    Hypertension Mother     Breast Cancer Maternal Aunt 50       MEDICATIONS:    Current Outpatient Medications:     metRONIDAZOLE (FLAGYL) 500 MG Oral Tab, Take 1 tablet (500 mg total) by mouth 3 (three) times daily., Disp: 14 tablet, Rfl: 0    hydrOXYzine 50 MG Oral Tab, Take 1 tablet (50 mg total) by mouth 3 (three) times daily as needed for Anxiety., Disp: 90 tablet, Rfl: 0    Meloxicam 15 MG Oral Tab, Take 1 tablet (15 mg total) by mouth daily. (Patient not taking: Reported on 1/30/2023), Disp: 14 tablet, Rfl: 0    ALLERGIES:  No Known Allergies      Review of Systems:  Constitutional:  Denies fatigue, night sweats, hot flashes  Eyes:  denies blurred or double vision  Cardiovascular:  denies chest pain or palpitations  Respiratory:  denies shortness of breath  Gastrointestinal:  denies heartburn, abdominal pain, diarrhea or constipation  Genitourinary:  denies dysuria,  incontinence, abnormal vaginal discharge, vaginal itching,   Musculoskeletal:  denies back pain   Skin/Breast:  Denies any breast pain, lumps, or discharge.   Neurological:  denies headaches, extremity weakness or numbness.  Psychiatric: denies depression or anxiety.  Endocrine:   denies excessive thirst or urination.  Heme/Lymph:  denies history of anemia, easy bruising or bleeding.      PHYSICAL EXAM:     Vitals:    08/07/24 1034   BP: 109/71   Pulse: 84   Weight: 200 lb (90.7 kg)   Height: 5' 1.5\" (1.562 m)       Body mass index is 37.18 kg/m².     Patient offered chaperone, patient declined    Constitutional: well developed, well nourished  Psychiatric:  Oriented to time, place, person and situation. Appropriate mood and affect  Head/Face: normocephalic  Neck/Thyroid: thyroid symmetric, no thyromegaly, no nodules, no adenopathy  Lymphatic:no abnormal supraclavicular or axillary adenopathy is noted  Breast: normal without palpable masses, tenderness, asymmetry, nipple discharge, nipple retraction or skin changes  Abdomen:  soft, nontender, nondistended, no masses  Skin/Hair: no unusual rashes or bruises  Extremities: no edema, no cyanosis    Pelvic Exam:  External Genitalia: normal appearance, hair distribution, and no lesions  Urethral Meatus:  normal in size, location, without lesions and prolapse  Bladder:  No fullness, masses or tenderness  Vagina:  Normal appearance without lesions, +thin white abnormal discharge  Cervix:  Normal without tenderness on motion  Uterus: normal in size, contour, position, mobility, without tenderness  Adnexa: normal without masses or tenderness  Perineum: normal  Anus: no hemorroids     Assessment & Plan:    ICD-10-CM    1. Well woman exam with routine gynecological exam  Z01.419       2. Screening for cervical cancer  Z12.4 ThinPrep PAP Smear     Hpv Dna  High Risk , Thin Prep Collect      3. Screening examination for STI  Z11.3 Chlamydia/Gc Amplification     Trichomonas  vaginalis, RODRIGO (Vaginal/Cervical)      4. BV (bacterial vaginosis)  N76.0     B96.89          Reviewed ASCCP guidelines with the patient   Pap done today  Contraception: Using condoms  Will treat for bacterial vaginosis on exam with oral metronidazole. --Encouraged condoms to prevent STD exposure  Breast Health:   - plan mammogram at 35 due to family hx  Reviewed current guidelines with the patient and to start Mammograms at age 40  Reviewed monthly self breast exams with the patient   Discussed diet, exercise, MVIs with Ca/Vit D  Follow up in 1 yr for NATHALIA Friedman    This note was prepared using Dragon Medical voice recognition dictation software. As a result errors may occur. When identified these errors have been corrected. While every attempt is made to correct errors during dictation discrepancies may still exist.

## 2024-08-08 LAB
C TRACH DNA SPEC QL NAA+PROBE: NEGATIVE
HPV E6+E7 MRNA CVX QL NAA+PROBE: NEGATIVE
N GONORRHOEA DNA SPEC QL NAA+PROBE: NEGATIVE

## 2024-08-12 LAB — T VAGINALIS RRNA SPEC QL NAA+PROBE: NEGATIVE

## 2024-09-06 ENCOUNTER — OFFICE VISIT (OUTPATIENT)
Dept: INTERNAL MEDICINE CLINIC | Facility: CLINIC | Age: 34
End: 2024-09-06
Payer: MEDICAID

## 2024-09-06 VITALS
DIASTOLIC BLOOD PRESSURE: 86 MMHG | SYSTOLIC BLOOD PRESSURE: 131 MMHG | WEIGHT: 200.31 LBS | TEMPERATURE: 98 F | HEIGHT: 62 IN | BODY MASS INDEX: 36.86 KG/M2 | HEART RATE: 97 BPM

## 2024-09-06 DIAGNOSIS — K52.9 GASTROENTERITIS: Primary | ICD-10-CM

## 2024-09-06 PROCEDURE — 99213 OFFICE O/P EST LOW 20 MIN: CPT | Performed by: INTERNAL MEDICINE

## 2024-09-06 NOTE — PROGRESS NOTES
Naomi Stafford is a 34 year old female.   Chief Complaint   Patient presents with    ER F/U     Patient went to [facility] for vomiting and diarrhea     HPI:   Naomi presents this afternoon for ER follow-up.    She was at work on Wednesday,  when she had the relatively sudden onset of nausea, multiple episodes of vomiting of gastric contents, abdominal cramping and watery diarrhea.  She began developing chills and lightheadedness but never had a fever.  She went to an outside ER where she was afebrile with stable vitals.  No testing was performed and she was discharged home.    She presents today for follow-up.  Symptoms have for the most part resolved.  She is tolerating soup without difficulty.  No vomiting since Wednesday.  Abdominal pain now better.  Stool earlier today more formed.  She did have some grapes at work on Wednesday.  No other unusual food.  No recent travel.  No friends or family members are sick with similar illnesses.  No recent antibiotic use.    She requests a note to return to work in 3 days on Monday    She takes no regular medications, only hydroxyzine as needed for anxiety.  No medication allergies.  Current Outpatient Medications   Medication Sig Dispense Refill    hydrOXYzine 50 MG Oral Tab Take 1 tablet (50 mg total) by mouth 3 (three) times daily as needed for Anxiety. 90 tablet 0    Meloxicam 15 MG Oral Tab Take 1 tablet (15 mg total) by mouth daily. 14 tablet 0     No Known Allergies   Past Medical History:    Anxiety state    Decorative tattoo    Varicella    Visual impairment    eyeglasses,contacts     Past Surgical History:   Procedure Laterality Date          x 2    D & c      Treat ectopic preg,abd preg        Social History:  Social History     Socioeconomic History    Marital status: Single   Tobacco Use    Smoking status: Never    Smokeless tobacco: Never   Vaping Use    Vaping status: Never Used   Substance and Sexual Activity    Alcohol  use: Yes     Alcohol/week: 0.0 standard drinks of alcohol     Comment: occ    Drug use: No     Comment: occ     Sexual activity: Yes     Partners: Male     Birth control/protection: Condom     Comment: same partner        EXAM:   GENERAL: Pleasant female appearing well in no distress  /86 (BP Location: Left arm, Patient Position: Sitting, Cuff Size: large)   Pulse 97   Temp 98.3 °F (36.8 °C) (Temporal)   Ht 5' 2\" (1.575 m)   Wt 200 lb 4.8 oz (90.9 kg)   LMP 07/15/2024 (Exact Date)   BMI 36.64 kg/m²   HEENT: Anicteric, conjunctiva pink, oropharynx normal  NECK: Supple without mass or lymphadenopathy  LUNGS: Resonant to percussion and clear to auscultation  CARDIAC: Rhythm regular S1 S2 normal without murmur   ABDOMEN: Not distended.  Bowel sounds active and normally pitched.  Soft and nontender.  No guarding or rebound.  No mass or hepatosplenomegaly      ASSESSMENT AND PLAN:   1. Gastroenteritis  Likely viral or food related, now resolving  Recommend beginning a bland diet and advancing as tolerated  Call or return if symptoms do not continue to resolve  Note provided to return to work without restriction on Monday, September 9.      The patient indicates understanding of these issues and agrees to the plan.  The patient is asked to return as needed.    Cr Nicole MD  9/6/2024  12:35 PM

## 2024-09-06 NOTE — PATIENT INSTRUCTIONS
Please begin solid food with a bland diet and advance as tolerated  Return if symptoms do not continue to go away  You may return to work on Monday

## 2024-10-29 ENCOUNTER — OFFICE VISIT (OUTPATIENT)
Dept: INTERNAL MEDICINE CLINIC | Facility: CLINIC | Age: 34
End: 2024-10-29
Payer: MEDICAID

## 2024-10-29 ENCOUNTER — LAB ENCOUNTER (OUTPATIENT)
Dept: LAB | Age: 34
End: 2024-10-29
Attending: INTERNAL MEDICINE
Payer: MEDICAID

## 2024-10-29 VITALS
BODY MASS INDEX: 37.36 KG/M2 | WEIGHT: 203 LBS | SYSTOLIC BLOOD PRESSURE: 126 MMHG | HEART RATE: 89 BPM | DIASTOLIC BLOOD PRESSURE: 85 MMHG | HEIGHT: 62 IN

## 2024-10-29 DIAGNOSIS — K52.9 GASTROENTERITIS: Primary | ICD-10-CM

## 2024-10-29 DIAGNOSIS — K52.9 GASTROENTERITIS: ICD-10-CM

## 2024-10-29 LAB
ALBUMIN SERPL-MCNC: 4.3 G/DL (ref 3.2–4.8)
ALBUMIN/GLOB SERPL: 1.2 {RATIO} (ref 1–2)
ALP LIVER SERPL-CCNC: 45 U/L
ALT SERPL-CCNC: 16 U/L
ANION GAP SERPL CALC-SCNC: 7 MMOL/L (ref 0–18)
AST SERPL-CCNC: 16 U/L (ref ?–34)
BILIRUB SERPL-MCNC: 0.9 MG/DL (ref 0.3–1.2)
BUN BLD-MCNC: 9 MG/DL (ref 9–23)
BUN/CREAT SERPL: 13.6 (ref 10–20)
CALCIUM BLD-MCNC: 9.2 MG/DL (ref 8.7–10.4)
CHLORIDE SERPL-SCNC: 107 MMOL/L (ref 98–112)
CO2 SERPL-SCNC: 26 MMOL/L (ref 21–32)
CREAT BLD-MCNC: 0.66 MG/DL
DEPRECATED RDW RBC AUTO: 40.4 FL (ref 35.1–46.3)
EGFRCR SERPLBLD CKD-EPI 2021: 118 ML/MIN/1.73M2 (ref 60–?)
ERYTHROCYTE [DISTWIDTH] IN BLOOD BY AUTOMATED COUNT: 12.6 % (ref 11–15)
FASTING STATUS PATIENT QL REPORTED: YES
GLOBULIN PLAS-MCNC: 3.5 G/DL (ref 2–3.5)
GLUCOSE BLD-MCNC: 84 MG/DL (ref 70–99)
HCT VFR BLD AUTO: 37 %
HGB BLD-MCNC: 12.6 G/DL
LIPASE SERPL-CCNC: 42 U/L (ref 12–53)
MCH RBC QN AUTO: 29.9 PG (ref 26–34)
MCHC RBC AUTO-ENTMCNC: 34.1 G/DL (ref 31–37)
MCV RBC AUTO: 87.9 FL
OSMOLALITY SERPL CALC.SUM OF ELEC: 288 MOSM/KG (ref 275–295)
PLATELET # BLD AUTO: 364 10(3)UL (ref 150–450)
POTASSIUM SERPL-SCNC: 3.7 MMOL/L (ref 3.5–5.1)
PROT SERPL-MCNC: 7.8 G/DL (ref 5.7–8.2)
RBC # BLD AUTO: 4.21 X10(6)UL
SODIUM SERPL-SCNC: 140 MMOL/L (ref 136–145)
WBC # BLD AUTO: 6.7 X10(3) UL (ref 4–11)

## 2024-10-29 PROCEDURE — 80053 COMPREHEN METABOLIC PANEL: CPT

## 2024-10-29 PROCEDURE — 99213 OFFICE O/P EST LOW 20 MIN: CPT | Performed by: INTERNAL MEDICINE

## 2024-10-29 PROCEDURE — 83690 ASSAY OF LIPASE: CPT

## 2024-10-29 PROCEDURE — 85027 COMPLETE CBC AUTOMATED: CPT

## 2024-10-29 PROCEDURE — 36415 COLL VENOUS BLD VENIPUNCTURE: CPT

## 2024-10-29 NOTE — PROGRESS NOTES
Naomi Stafford is a 34 year old female.   Chief Complaint   Patient presents with    Abdominal Pain     Patient c/o diarrhea and vomiting     Headache     HPI:   Yesterday she developed symptoms of nausea with approximately 3 episodes of vomiting of gastric contents, along with intermittent mid abdominal pain and multiple episodes of watery diarrhea.  She also has had an associated headache.  No associated fever.  No hematemesis.  No rectal bleeding.  Symptoms now are better.  Last episode of vomiting was yesterday and she no longer feels nauseous, and she tolerated clear liquids earlier today.  Diarrhea now is better also.  No recent unusual food except she had guacamole 2 days ago.  No friends or family members are sick with similar symptoms.  No recent antibiotic use or travel.  She had a similar self-limited episode which prompted a visit in September.    Medications reviewed, only hydroxyzine as needed.  No medication allergies  Current Outpatient Medications   Medication Sig Dispense Refill    hydrOXYzine 50 MG Oral Tab Take 1 tablet (50 mg total) by mouth 3 (three) times daily as needed for Anxiety. 90 tablet 0     Allergies[1]   Past Medical History:    Anxiety state    Decorative tattoo    Varicella    Visual impairment    eyeglasses,contacts     Past Surgical History:   Procedure Laterality Date          x 2    D & c      Treat ectopic preg,abd preg        Social History:  Social History     Socioeconomic History    Marital status: Single   Tobacco Use    Smoking status: Never    Smokeless tobacco: Never   Vaping Use    Vaping status: Never Used   Substance and Sexual Activity    Alcohol use: Yes     Alcohol/week: 0.0 standard drinks of alcohol     Comment: occ    Drug use: No     Comment: occ     Sexual activity: Yes     Partners: Male     Birth control/protection: Condom     Comment: same partner        EXAM:   GENERAL: Pleasant female appearing well in no distress  /85 (BP Location:  Left arm, Patient Position: Sitting, Cuff Size: large)   Pulse 89   Ht 5' 2\" (1.575 m)   Wt 203 lb (92.1 kg)   LMP 07/15/2024 (Exact Date)   BMI 37.13 kg/m²   HEENT: Anicteric, conjunctiva pink, oropharynx normal  NECK: Supple without mass or lymphadenopathy  LUNGS: Resonant to percussion and clear to auscultation  CARDIAC: Rhythm regular S1 S2 normal without murmur  ABDOMEN: Not distended.  Bowel sounds active and normally pitched.  Soft with mild mid abdominal tenderness.  No guarding or rebound.  No mass or hepatosplenomegaly.  Nelson sign negative      ASSESSMENT AND PLAN:   1. Gastroenteritis  Likely viral or food related.  Symptoms now resolving  Check CMP CBC and lipase today.  Order sent  Recommend pushing fluids, avoiding milk and dairy products, bland diet  Recommend she call if symptoms do not continue to resolve.  Offered prescriptions such as Zofran and dicyclomine but she declines  - Comp Metabolic Panel (14); Future  - CBC, Platelet; No Differential; Future  - Lipase; Future      The patient indicates understanding of these issues and agrees to the plan.  The patient is asked to return as needed.    Cr Nicole MD  10/29/2024  1:11 PM         [1] No Known Allergies

## 2024-10-29 NOTE — PATIENT INSTRUCTIONS
Await results of today's blood tests  Drink extra fluid, avoid milk and dairy products for now, and follow a bland diet  Let me know if your symptoms do not continue to go away

## 2025-01-29 ENCOUNTER — OFFICE VISIT (OUTPATIENT)
Dept: OBGYN CLINIC | Facility: CLINIC | Age: 35
End: 2025-01-29

## 2025-01-29 VITALS
HEART RATE: 79 BPM | WEIGHT: 202 LBS | BODY MASS INDEX: 37 KG/M2 | SYSTOLIC BLOOD PRESSURE: 131 MMHG | DIASTOLIC BLOOD PRESSURE: 88 MMHG

## 2025-01-29 DIAGNOSIS — N89.8 VAGINAL DISCHARGE: Primary | ICD-10-CM

## 2025-01-29 PROCEDURE — 99213 OFFICE O/P EST LOW 20 MIN: CPT | Performed by: NURSE PRACTITIONER

## 2025-01-29 RX ORDER — METRONIDAZOLE 500 MG/1
500 TABLET ORAL 2 TIMES DAILY
Qty: 14 TABLET | Refills: 0 | Status: SHIPPED | OUTPATIENT
Start: 2025-01-29

## 2025-01-29 NOTE — PROGRESS NOTES
[facility]   Obstetrics and Gynecology    Naomi Stafford is a 34 year old female  Patient's last menstrual period was 2025 (exact date).   Chief Complaint   Patient presents with    Gyn Problem     VAGINAL ITCHING/DISCHARGE   .   C/o vaginal itching and discharge x 2-3 days. No pelvic pain, no urinary symptoms.  Has taken a few baths. No changes in soaps or detergents, no recent antibiotics.    Pap:2021 NILM, neg HPV; due cotesting in   Contraception:none  Mammo: never     Colonoscopy: n/a    OBSTETRICS HISTORY:  OB History    Para Term  AB Living   6 2 2 0 3 2   SAB IAB Ectopic Multiple Live Births   1 1 1 0 2       GYNE HISTORY:  Menarche: 11 (2025  1:43 PM)  Period Cycle (Days): monthly (2025  1:43 PM)  Period Duration (Days): 4-5 days (2025  1:43 PM)  Period Flow: NORMAL (2025  1:43 PM)  Use of Birth Control (if yes, specify type): Condoms (2025  1:43 PM)  Pap Date: 24 (2025  1:43 PM)  Pap Result Notes: NEG PAP/HPV NEG (2025  1:43 PM)      History   Sexual Activity    Sexual activity: Yes    Partners: Male    Birth control/ protection: Condom     Comment: same partner       MEDICAL HISTORY:  Past Medical History:    Anxiety state    Decorative tattoo    Varicella    Visual impairment    eyeglasses,contacts       SOCIAL HISTORY:  Social History     Socioeconomic History    Marital status: Single     Spouse name: Not on file    Number of children: Not on file    Years of education: Not on file    Highest education level: Not on file   Occupational History    Not on file   Tobacco Use    Smoking status: Never    Smokeless tobacco: Never   Vaping Use    Vaping status: Never Used   Substance and Sexual Activity    Alcohol use: Yes     Alcohol/week: 0.0 standard drinks of alcohol     Comment: occ    Drug use: No     Comment: occ     Sexual activity: Yes     Partners: Male     Birth control/protection: Condom     Comment: same  partner   Other Topics Concern    Not on file   Social History Narrative    Not on file     Social Drivers of Health     Financial Resource Strain: Not on file   Food Insecurity: Not on file   Transportation Needs: Not on file   Physical Activity: Not on file   Stress: Not on file   Social Connections: Not on file   Housing Stability: Not on file       MEDICATIONS:    Current Outpatient Medications:     metroNIDAZOLE (FLAGYL) 500 MG Oral Tab, Take 1 tablet (500 mg total) by mouth in the morning and 1 tablet (500 mg total) before bedtime., Disp: 14 tablet, Rfl: 0    ALLERGIES:  Allergies[1]      Review of Systems:  Constitutional:  Denies fatigue, night sweats, hot flashes  Cardiovascular:  denies chest pain or palpitations  Respiratory:  denies shortness of breath  Gastrointestinal:  denies heartburn, abdominal pain, diarrhea or constipation  Genitourinary:  denies dysuria, incontinence, + abnormal vaginal discharge, vaginal itching   Musculoskeletal:  denies back pain.  Skin/Breast:  Denies any breast pain, lumps, or discharge.   Neurological:  denies headaches, extremity weakness or numbness.  Psychiatric: denies depression or anxiety.  Endocrine:   denies excessive thirst or urination.  Heme/Lymph:  denies history of anemia, easy bruising or bleeding.      PHYSICAL EXAM:     Vitals:    01/29/25 1346   BP: 131/88   Pulse: 79   Weight: 202 lb (91.6 kg)     Body mass index is 36.95 kg/m².     Patient offered chaperone, patient declined    Constitutional: well developed, well nourished    Psychiatric:  Oriented to time, place, person and situation. Appropriate mood and affect    Pelvic Exam:  External Genitalia: normal appearance, hair distribution, and no lesions  Urethral Meatus:  normal in size, location, without lesions and prolapse  Bladder:  No fullness, masses or tenderness  Vagina:  Normal appearance without lesions, +thin white abnormal discharge  Cervix:  Normal without tenderness on motion  Uterus: normal  in size, contour, position, mobility, without tenderness  Adnexa: normal without masses or tenderness  Perineum: normal  Anus: no hemorroids   Lymph node: no inguinal lymph nodes    Assessment & Plan:  Naomi was seen today for gyn problem.    Diagnoses and all orders for this visit:    Vaginal discharge  -     Vaginitis Vaginosis PCR Panel; Future    Other orders  -     metroNIDAZOLE (FLAGYL) 500 MG Oral Tab; Take 1 tablet (500 mg total) by mouth in the morning and 1 tablet (500 mg total) before bedtime.      Will treat for bacterial vaginosis on exam with oral flagyl 500 mg PO BID x 7 days  No alcohol or intercourse for 7-10 days  Follow up if symptoms not improved.      Sabine Cutler, NATHALIA    This note was prepared using Dragon Medical voice recognition dictation software. As a result errors may occur. When identified these errors have been corrected. While every attempt is made to correct errors during dictation discrepancies may still exist.         [1] No Known Allergies

## 2025-01-30 ENCOUNTER — TELEPHONE (OUTPATIENT)
Dept: OBGYN CLINIC | Facility: CLINIC | Age: 35
End: 2025-01-30

## 2025-01-30 DIAGNOSIS — Z11.3 SCREENING EXAMINATION FOR STI: Primary | ICD-10-CM

## 2025-01-30 LAB
BV BACTERIA DNA VAG QL NAA+PROBE: POSITIVE
C GLABRATA DNA VAG QL NAA+PROBE: NEGATIVE
C KRUSEI DNA VAG QL NAA+PROBE: NEGATIVE
CANDIDA DNA VAG QL NAA+PROBE: NEGATIVE
T VAGINALIS DNA VAG QL NAA+PROBE: POSITIVE

## 2025-01-30 NOTE — TELEPHONE ENCOUNTER
Patient aware of results, patient requesting further STI testing.    To Sabine Cutler for orders.

## 2025-01-30 NOTE — TELEPHONE ENCOUNTER
Please call patient. Culture +bacterial vaginosis and trichomonas. Continue flagyl PO as prescribed for 7 days. Partner needs treament with their provider. No intercourse til 1 week post both partner treatment.  Offer other STI Tesitng - urine gc/ct and labs. If desires I can place in chart

## 2025-02-04 ENCOUNTER — LAB ENCOUNTER (OUTPATIENT)
Dept: LAB | Facility: HOSPITAL | Age: 35
End: 2025-02-04
Attending: NURSE PRACTITIONER
Payer: MEDICAID

## 2025-02-04 DIAGNOSIS — Z11.3 SCREENING EXAMINATION FOR STI: ICD-10-CM

## 2025-02-04 LAB
HBV SURFACE AG SER-ACNC: <0.1 [IU]/L
HBV SURFACE AG SERPL QL IA: NONREACTIVE
HCV AB SERPL QL IA: NONREACTIVE
T PALLIDUM AB SER QL IA: NONREACTIVE

## 2025-02-04 PROCEDURE — 86780 TREPONEMA PALLIDUM: CPT

## 2025-02-04 PROCEDURE — 87491 CHLMYD TRACH DNA AMP PROBE: CPT

## 2025-02-04 PROCEDURE — 86803 HEPATITIS C AB TEST: CPT

## 2025-02-04 PROCEDURE — 36415 COLL VENOUS BLD VENIPUNCTURE: CPT

## 2025-02-04 PROCEDURE — 87389 HIV-1 AG W/HIV-1&-2 AB AG IA: CPT

## 2025-02-04 PROCEDURE — 87591 N.GONORRHOEAE DNA AMP PROB: CPT

## 2025-02-04 PROCEDURE — 87340 HEPATITIS B SURFACE AG IA: CPT

## 2025-02-05 LAB
C TRACH DNA SPEC QL NAA+PROBE: NEGATIVE
N GONORRHOEA DNA SPEC QL NAA+PROBE: NEGATIVE

## 2025-05-27 NOTE — ED INITIAL ASSESSMENT (HPI)
Post-Discharge Transitional Care Follow Up      Samantha Nichols   YOB: 1990    Date of Office Visit:  5/27/2025  Date of Hospital Admission: 5/15/25  Date of Hospital Discharge: 5/20/25  Readmission Risk Score (high >=14%. Medium >=10%):Readmission Risk Score: 18.9      Care management risk score Rising risk (score 2-5) and Complex Care (Scores >=6): No Risk Score On File     Non face to face  following discharge, date last encounter closed (first attempt may have been earlier): 05/22/2025     Call initiated 2 business days of discharge: Yes     Hospital discharge follow-up  -     TX DISCHARGE MEDS RECONCILED W/ CURRENT OUTPATIENT MED LIST    Medical Decision Making: straightforward         Diffuse muscle pain, ?opioid hyperalgesia: Likely secondary to obesity. Patient DOES NOT HAVE sickle cell disease or trait.  Continue morphine 15 mg IR daily  Mobic 30 mg daily as needed  Palliative care follow-up  Lidocaine patches  Scheduled tylenol q8h  Will give Journavx - non central analgesia medication which can be used as an adjuct / alternative medication to taper patient off narcotics. Given fact that patient does not have sickle cell anemia or trait, excess use of opioids in a young female promotes tolerance/hyperalgesia. This was seen inpatient as patient developed inadequate pain control dilaudid  Consider SNRI for aid in sleep or pain control  Hypertension: /111, repeat 140s/100s manual.  Elevated BP possibly secondary to pain.  Possibly secondary to medication noncompliance.  Inpatient patient had good blood pressure control on amlodipine 10 mg daily, Coreg 6.25 mg twice daily  Continue current regimen  If elevated at next clinic visit, consider increasing regimen  Chronic constipation:  Lactulose refill  Morbid obesity, chronic dyspnea: Explained the relationship between chronic pain syndrome and morbid obesity plus will check markers as listed below for signs of chronic  Pt with white vaginal discharge and foul odor x 3 days; no fever/vom/abd pain/dysuria/hematuria or vag bleeding

## 2025-07-16 ENCOUNTER — OFFICE VISIT (OUTPATIENT)
Dept: OBGYN CLINIC | Facility: CLINIC | Age: 35
End: 2025-07-16
Payer: MEDICAID

## 2025-07-16 VITALS
BODY MASS INDEX: 37 KG/M2 | WEIGHT: 200 LBS | SYSTOLIC BLOOD PRESSURE: 124 MMHG | HEART RATE: 86 BPM | DIASTOLIC BLOOD PRESSURE: 86 MMHG

## 2025-07-16 DIAGNOSIS — N89.8 VAGINAL ODOR: Primary | ICD-10-CM

## 2025-07-16 PROCEDURE — 99213 OFFICE O/P EST LOW 20 MIN: CPT | Performed by: NURSE PRACTITIONER

## 2025-07-16 RX ORDER — METRONIDAZOLE 500 MG/1
500 TABLET ORAL 2 TIMES DAILY
Qty: 14 TABLET | Refills: 0 | Status: SHIPPED | OUTPATIENT
Start: 2025-07-16

## 2025-07-16 NOTE — PROGRESS NOTES
[facility]   Obstetrics and Gynecology    Naomi Stafford is a 34 year old female  Patient's last menstrual period was 2025 (approximate).   Chief Complaint   Patient presents with    Gyn Exam     \"Smelly discharge\" per pt. Since last week   .   History of Present Illness  Naomi Stafford is a 34 year old female who presents with concerns of increased vaginal discharge and possible bacterial vaginosis.    Vaginal discharge  - Increased vaginal discharge for the past week  - Associated with odor  - No abnormal color or consistency reported    Associated genitourinary symptoms  - No pelvic pain  - No pruritus (itching)    Menstrual history  - Menstrual periods are regular    Exposures and risk factors  - No recent changes in soaps, detergents, or antibiotics  - Sexual partner has remained the same      Pap:2024 NILM, negative HPV  Contraception: condoms    OBSTETRICS HISTORY:  OB History    Para Term  AB Living   6 2 2 0 3 2   SAB IAB Ectopic Multiple Live Births   1 1 1 0 2       GYNE HISTORY:  Menarche: 11 (2025 10:17 AM)  Period Cycle (Days): monthly (2025 10:17 AM)  Period Duration (Days): 4-5 days (2025 10:17 AM)  Period Flow: NORMAL (2025 10:17 AM)  Use of Birth Control (if yes, specify type): Condoms (2025 10:17 AM)  Pap Date: 24 (2025 10:17 AM)  Pap Result Notes: NEG PAP/HPV NEG (2025 10:17 AM)      History   Sexual Activity    Sexual activity: Yes    Partners: Male    Birth control/ protection: Condom     Comment: same partner       MEDICAL HISTORY:  Past Medical History:    Anxiety state    Decorative tattoo    Varicella    Visual impairment    eyeglasses,contacts       SOCIAL HISTORY:  Social History     Socioeconomic History    Marital status: Single     Spouse name: Not on file    Number of children: Not on file    Years of education: Not on file    Highest education level: Not on file   Occupational History    Not on  file   Tobacco Use    Smoking status: Never    Smokeless tobacco: Never   Vaping Use    Vaping status: Never Used   Substance and Sexual Activity    Alcohol use: Yes     Alcohol/week: 0.0 standard drinks of alcohol     Comment: occ    Drug use: No     Comment: occ     Sexual activity: Yes     Partners: Male     Birth control/protection: Condom     Comment: same partner   Other Topics Concern    Not on file   Social History Narrative    Not on file     Social Drivers of Health     Food Insecurity: Not on file   Transportation Needs: Not on file   Stress: Not on file   Housing Stability: Not on file       MEDICATIONS:    Current Outpatient Medications:     metroNIDAZOLE 500 MG Oral Tab, Take 1 tablet (500 mg total) by mouth in the morning and 1 tablet (500 mg total) before bedtime., Disp: 14 tablet, Rfl: 0    ALLERGIES:  No Known Allergies      Review of Systems:  Constitutional:  Denies fatigue, night sweats, hot flashes  Cardiovascular:  denies chest pain or palpitations  Respiratory:  denies shortness of breath  Gastrointestinal:  denies heartburn, abdominal pain, diarrhea or constipation  Genitourinary:  denies dysuria, incontinence, abnormal vaginal discharge, vaginal itching   Musculoskeletal:  denies back pain.  Skin/Breast:  Denies any breast pain, lumps, or discharge.   Neurological:  denies headaches, extremity weakness or numbness.  Psychiatric: denies depression or anxiety.  Endocrine:   denies excessive thirst or urination.  Heme/Lymph:  denies history of anemia, easy bruising or bleeding.      PHYSICAL EXAM:     Vitals:    07/16/25 1025   BP: 124/86   Pulse: 86   Weight: 200 lb (90.7 kg)     Body mass index is 36.58 kg/m².     Patient offered chaperone, patient declined    Constitutional: well developed, well nourished    Psychiatric:  Oriented to time, place, person and situation. Appropriate mood and affect    Pelvic Exam:  External Genitalia: normal appearance, hair distribution, and no  lesions  Urethral Meatus:  normal in size, location, without lesions and prolapse  Bladder:  No fullness, masses or tenderness  Vagina:  Normal appearance without lesions, +thin white abnormal discharge  Cervix:  Normal without tenderness on motion  Uterus: normal in size, contour, position, mobility, without tenderness  Adnexa: normal without masses or tenderness  Perineum: normal  Anus: no hemorroids   Lymph node: no inguinal lymph nodes      Assessment & Plan:    ICD-10-CM    1. Vaginal odor  N89.8 Vaginitis Vaginosis PCR Panel              Assessment & Plan  Bacterial Vaginosis  Increased vaginal discharge with odor consistent with bacterial vaginosis.  - Advised to avoid swimming and alcohol during antibiotic course.  -will prescription flagyl 500 mg PO BID x 7days  - Encouraged increased water intake to restore normal vaginal abdullahi.        NATHALIA Gallagher        This note was prepared using Dragon Medical voice recognition dictation software. As a result errors may occur. When identified these errors have been corrected. While every attempt is made to correct errors during dictation discrepancies may still exist.

## 2025-07-16 NOTE — PROGRESS NOTES
The following individual(s) verbally consented to be recorded using ambient AI listening technology and understand that they can each withdraw their consent to this listening technology at any point by asking the clinician to turn off or pause the recording:    Patient name: Naomi Grier Nydia  Additional names:

## 2025-07-17 LAB
BV BACTERIA DNA VAG QL NAA+PROBE: POSITIVE
C GLABRATA DNA VAG QL NAA+PROBE: NEGATIVE
C KRUSEI DNA VAG QL NAA+PROBE: NEGATIVE
CANDIDA DNA VAG QL NAA+PROBE: NEGATIVE
T VAGINALIS DNA VAG QL NAA+PROBE: NEGATIVE

## (undated) DEVICE — COVER SGL STRL LGHT HNDL BLU

## (undated) DEVICE — REM POLYHESIVE ADULT PATIENT RETURN ELECTRODE: Brand: VALLEYLAB

## (undated) DEVICE — SUCTION CANISTER, 3000CC,SAFELINER: Brand: DEROYAL

## (undated) DEVICE — STIRRUP STRAP W/SLING RING

## (undated) DEVICE — D AND C PACK: Brand: MEDLINE INDUSTRIES, INC.

## (undated) DEVICE — TUBING SUCTION COLLECTION SET

## (undated) DEVICE — STERILE LATEX POWDER-FREE SURGICAL GLOVESWITH NITRILE COATING: Brand: PROTEXIS

## (undated) DEVICE — Device: Brand: JELCO

## (undated) DEVICE — STERILE POLYISOPRENE POWDER-FREE SURGICAL GLOVES: Brand: PROTEXIS

## (undated) DEVICE — SOL  .9 1000ML BTL

## (undated) DEVICE — C SECTION PACK: Brand: MEDLINE INDUSTRIES, INC.

## (undated) DEVICE — 3M™ STERI-STRIP™ REINFORCED ADHESIVE SKIN CLOSURES, R1547, 1/2 IN X 4 IN (12 MM X 100 MM), 6 STRIPS/ENVELOPE: Brand: 3M™ STERI-STRIP™

## (undated) DEVICE — NON-ADHERENT PAD PREPACK: Brand: TELFA

## (undated) DEVICE — KENDALL SCD EXPRESS SLEEVES, KNEE LENGTH, MEDIUM: Brand: KENDALL SCD

## (undated) DEVICE — CANISTER SCT BTL SL CAP BRKLY

## (undated) DEVICE — STERILE SURGICAL LUBRICANT, METAL TUBE: Brand: SURGILUBE

## (undated) DEVICE — GOWN SURG AERO BLUE PERF LG

## (undated) DEVICE — SUTURE VICRYL 0 J340H

## (undated) DEVICE — VIOLET BRAIDED (POLYGLACTIN 910), SYNTHETIC ABSORBABLE SUTURE: Brand: COATED VICRYL

## (undated) DEVICE — ENCORE® LATEX MICRO SIZE 6.5, STERILE LATEX POWDER-FREE SURGICAL GLOVE: Brand: ENCORE

## (undated) DEVICE — CURRETTE 7MM CVD

## (undated) DEVICE — CANISTER SAFETOUCH SYST DISP

## (undated) DEVICE — TRAY CATH BDX IC 14FR 2L FL

## (undated) DEVICE — SUTURE MONOCRYL 4-0 PS-2

## (undated) DEVICE — ABDOMINAL PAD: Brand: CURITY

## (undated) DEVICE — ENCORE® LATEX ACCLAIM SIZE 6, STERILE LATEX POWDER-FREE SURGICAL GLOVE: Brand: ENCORE

## (undated) DEVICE — SUTURE PLAIN GUT 3-0 CT-1

## (undated) NOTE — Clinical Note
INSTRUCTIONS FOR PRE-SURGICAL   ANTIMICROBIAL BATH/SHOWER    Your doctor has recommended a pre-surgical CHG (chlorhexidine gluconate) shower/bath with Betasept (also sold as Hibiclens). It reduces bacteria that can potentially cause infection.   Betasept Keep out of reach of children. If swallowed get medica help or contact [employer]. Store between 60-80 degrees F. Fabric Warning! CHG WILL STAIN YOUR FABRICS! Use with care around shower curtains, towels washcloths rugs and clothes.   Wipe

## (undated) NOTE — IP AVS SNAPSHOT
[address]  [address] [facility] Em[place]ne M[place] ~ [phone]                Discharge Summary   2/1/2017    Aleta Began           Admission Information        Provider Department    2/1/2017 Bhaskar Dolan MD [facility] 3e C-D (hypertension). Preeclampsia/hypertension can happen during pregnancy and up to 6 weeks following childbirth.   Contact your doctor or midwife immediately if you experience any of the following symptoms:  · Headache that does not go away  · Visual changes OB Lab Results  (Last 3 results in the past 270 days)    Blood Type Rh Factor Rubella GBBS    (08/15/16)  A (08/15/16)  Positive (08/15/16)  equivocal --    (07/07/16)  A (07/07/16)  Positive        Recent Hematology Lab Results  (Last 3 results in the pas coverage. Patient [address] is a Federal Navigator program that can help with your Affordable Care Act coverage, as well as all types of Medicaid plans.   To get signed up and covered, please call [phone] and ask to get set up for an insuran

## (undated) NOTE — Clinical Note
AUTHORIZATION FOR SURGICAL OPERATION OR OTHER PROCEDURE    1.  I hereby [facility] Dr. Love Eldridge [facility] assigned to my case to perform the following operation and/or procedure at the [facility]:    _____IUD INSERTION_________ Patient signature:  ___________________________________________________             Relationship to Patient:             Parent    Responsible person                            Spouse  In case of minor or                     Other  _____________   Incompet

## (undated) NOTE — LETTER
June 4, 2020    Sincere     Dear Elda Lamar: We have received your request to set up your [employer] account, your secure online medical record. Our system shows that you already have an activated account.  You c

## (undated) NOTE — ED AVS SNAPSHOT
[facility] Emergency Department    [place]  [address] [address]    Phone:  [phone]    Fax:  [facility]   MRN: [id number]    Department:  [facility] Emergency Department   Date of Visit:  4/2/2 IF THERE IS ANY CHANGE OR WORSENING OF YOUR CONDITION, CALL YOUR PRIMARY CARE PHYSICIAN AT ONCE OR RETURN IMMEDIATELY TO THE EMERGENCY DEPARTMENT.     If you have been prescribed any medication(s), please fill your prescription right away and begin taking t

## (undated) NOTE — LETTER
Date & Time: 2/2/2023, 10:46 AM  Patient: Gorge Sweet      To Whom It May Concern:    Sofi Leahy was seen and treated in our department. She is excused from work starting 12/28/2022 and cleared to return to work 2/06/2023  If you have any questions or concerns, please do not hesitate to call.       Garry Webber MD

## (undated) NOTE — MR AVS SNAPSHOT
After Visit Summary   4/29/2021    Briseyda Kumar    MRN: [id number]           Visit Information     Date & Time  4/29/2021 10:00 AM Provider  Joshua Mullins, [address] [place] [address], [address] [place] [address],[address], [facility]  [employer] [facility] COVID VACCINE RESOURCE Valerio Laura COVID VACCINE      Follow-up Instructions    Return in about 1 year (around 4/29/2022). [facility] now offers Video Visits through [address] for adult and pediatric patients.   Video Visits are availab [facility]asia Chatterjee [facility]   Monday – Friday  10:00 am – 10:00 pm   Saturday – Sunday  10:00 am – 4:00 pm     [facility]   Monday – Friday  4:00 pm – 10:00 pm   Saturday – Sunday  10:00 am – 4:00 pm  WALK-IN CARE  Emergency Medi

## (undated) NOTE — LETTER
9/6/2024              Naomi Stafford        [address]        [facility] [address]         To Whom It May Concern,    Ms. Naomi Stafford is medically able to return to her usual duties at work without restriction on Monday, September 9    Sincerely,    Cr Nicole MD          Document electronically generated by:  Cr Nicole MD

## (undated) NOTE — MR AVS SNAPSHOT
[place]  [address]  [phone]               Thank you for choosing us for your health care visit with Nurse.   We are glad to serve you and happy to provide you with this summary of your vis DASH eating plan Adopt a diet rich in fruits, vegetables, and low fat dairy products with reduced content of saturated and total fat.    Dietary sodium reduction Reduce dietary sodium intake to <= 100 mmol per day (2.4 g sodium or 6 g sodium chloride)   Aer

## (undated) NOTE — IP AVS SNAPSHOT
[address]  [address] [facility] [phone]                Discharge Summary   3/25/2017    Lemon Glow           Admission Information        Provider Department    3/25/2017 Faye Begum DO [facility] 3s Discharge Instructions for  Section ()  You had a  section, or . During the , your baby was delivered through a surgical incision in your stomach and uterus. Full recovery after a  can take time.  It’s imp · Pain or urgency with urination  · Foul odor from vaginal discharge  · Trouble urinating or emptying your bladder  · No bowel movement within 1 week after the birth of your baby  · Swollen, red, painful area in the leg  · Appearance of rash or hives  · So 0.2 (03/27/17)  0.0    (03/26/17)  86 (03/26/17)  8 (03/26/17)  6 (03/26/17)  1 (03/26/17)  0  (03/26/17)  16.4 (H) (03/26/17)  1.5 (03/26/17)  1.1 (H) (03/26/17)  0.1 (03/26/17)  0.0    (03/25/17)  92 (03/25/17)  4 (03/25/17)  4 (03/25/17)  0 (03/25/17) Medication Side Effects - Medications to be taken at home  As your caregivers, we want you to be aware of the medications you are prescribed to take and their potential SIDE EFFECTS.  Your nurse will review your medications with you before you are discharge

## (undated) NOTE — LETTER
10/29/2018              Gerard Ortega        [address] [facility]         Dear Sofia Falcon,    This letter is to inform you that our office has made several attempts to reach you by phone without success.   We were attempting to co

## (undated) NOTE — LETTER
MINOR CASE LETTER      12/14/2018        Dear Cuca Flair are having a dilation and curettage on 12/19/18 at 1pm.    Do not eat or drink anything (including water) after midnight the night before surgery.     If your procedure is scheduled later in the d

## (undated) NOTE — ED AVS SNAPSHOT
[facility] Emergency Department    [place]  [address] [address]    Phone:  [phone]    Fax:  [facility]   MRN: [id number]    Department:  [facility] Emergency Department   Date of Visit:  4/2/2 DYSURIA, UNCERTAIN CAUSE (ADULT) (ENGLISH)      Disclosure     Insurance plans vary and the physician(s) referred by the ER may not be covered by your plan. Please contact your insurance company to determine coverage for follow-up care and referrals.     Hugo Gabriel prescription right away and begin taking the medication(s) as directed    If the emergency physician has read X-rays, these will be re-interpreted by a radiologist.  If there is a significant change in your reading, you will be contacted.  Please make sure Medicaid plans. To get signed up and covered, please call [phone] and ask to get set up for an insurance coverage that is in-network with Nate Santos.         [employer]

## (undated) NOTE — LETTER
AUTHORIZATION FOR SURGICAL OPERATION OR OTHER PROCEDURE    1.  I hereby authorize Dr. Lexi Burgess and [facility] staff assigned to my case to perform the following operation and/or procedure at the [facility]:    IUD REMOVAL      __________ (please print)      Patient signature:  ___________________________________________________             Relationship to Patient:           []  Parent    Responsible person                          []  Spouse  In case of minor or                    [] Other

## (undated) NOTE — IP AVS SNAPSHOT
[address]  [address] [facility] [phone]                Discharge Summary   1/15/2017    Leeann Henao           Admission Information        Provider Department    1/15/2017 Poonam Pierre DO [facility] 3e · Stomach pain/heartburn  · Swelling in your hands, feet or face  · Sudden weight gain  · Shortness of breath  · \"Just not feeling right\"        Follow-up Information     Follow up with Danielle Alvarado DO.     Specialty:  OBSTETRICS & GYNECOLOGY    Wh WBC RBC Hemoglobin Hematocrit MCV MCH MCHC RDW Platelet MPV    (68/76/34)  8.1 (01/04/17)  3.32 (L) (01/04/17)  9.7 (L) (01/04/17)  28.7 (L) (01/04/17)  86.6 (01/04/17)  29.1 (01/04/17)  33.6 -- (01/04/17)  311 (01/04/17)  7.0 (L)      Radiology Exams

## (undated) NOTE — Clinical Note
The [facility]  Scheduling the Birth of Your Nicole Drum    You are scheduled for a  delivery on 4/3/17. You should arrive at the [facility] at 11:30am.      Please follow the enclosed antimicrobial wash instructions.   This wash s

## (undated) NOTE — LETTER
1/25/2023              Amparo Espinosa        [address]        [place] [address]         To Whom It May Concern,    Jeovanny Galvan is excused from work from 12/28/2022 to 01/26/2023 due to a medical condition.     Sincerely,    MD Marti [facility] [address]  [phone]            Document electronically generated by:  Heydi Tatum

## (undated) NOTE — LETTER
[address] [place] [address] Rd, [place], [place]     AUTHORIZATION FOR SURGICAL OPERATION OR PROCEDURE    I hereby authorize Dr. Eitan Almanza MD, my Physician(s) and whomever may be designated as the doctor's Assistant, to perform the fo 4. I consent to the photographing of procedure(s) to be performed for the purposes of advancing medicine, science and/or education, provided my identity is not revealed.  If the procedure has been videotaped, the physician/surgeon will obtain the original v (Witness signature)                                                                                                  (Date)                                (Time)  STATEMENT OF PHYSICIAN My signature below affirms that prior to the time of the procedure;  I

## (undated) NOTE — IP AVS SNAPSHOT
[address]  [address] [facility] [phone]                Discharge Summary   3/20/2017    Simone Saleem           Admission Information        Provider Department    3/20/2017 Lise Haji MD [facility] 3se [address] [address]     Phone:  [phone]    - ibuprofen 600 MG Tabs      Please  your prescriptions at the location directed by your doctor or nurse     Bring a A (08/15/16)  Positive      (07/07/16)  A (07/07/16)  Positive        Recent Hematology Lab Results  (Last 3 results in the past 90 days)    WBC RBC Hemoglobin Hematocrit MCV MCH MCHC RDW Platelet MPV    (50/82/46)  12.4 (H) (03/21/17)  3.14 (L) (03/21/17) Psychosocial/Spiritual Support Resolved   Community Resources Resolved   Preeclampsia/Hypertension Resolved         Respiratory Therapy  Resolved   Incentive Spirometer Resolved               Additional Information       Call your doctor if you have feelin